# Patient Record
Sex: MALE | Race: WHITE | NOT HISPANIC OR LATINO | Employment: OTHER | URBAN - METROPOLITAN AREA
[De-identification: names, ages, dates, MRNs, and addresses within clinical notes are randomized per-mention and may not be internally consistent; named-entity substitution may affect disease eponyms.]

---

## 2019-10-24 ENCOUNTER — LAB (OUTPATIENT)
Dept: FAMILY MEDICINE CLINIC | Facility: CLINIC | Age: 69
End: 2019-10-24

## 2019-10-24 ENCOUNTER — OFFICE VISIT (OUTPATIENT)
Dept: FAMILY MEDICINE CLINIC | Facility: CLINIC | Age: 69
End: 2019-10-24

## 2019-10-24 VITALS
BODY MASS INDEX: 24.06 KG/M2 | HEIGHT: 72 IN | SYSTOLIC BLOOD PRESSURE: 146 MMHG | WEIGHT: 177.6 LBS | DIASTOLIC BLOOD PRESSURE: 74 MMHG | TEMPERATURE: 97.8 F | HEART RATE: 70 BPM | OXYGEN SATURATION: 97 % | RESPIRATION RATE: 16 BRPM

## 2019-10-24 DIAGNOSIS — M50.30 DDD (DEGENERATIVE DISC DISEASE), CERVICAL: ICD-10-CM

## 2019-10-24 DIAGNOSIS — G89.4 CHRONIC PAIN SYNDROME: ICD-10-CM

## 2019-10-24 DIAGNOSIS — Z23 NEED FOR IMMUNIZATION AGAINST INFLUENZA: ICD-10-CM

## 2019-10-24 DIAGNOSIS — Z00.00 PREVENTATIVE HEALTH CARE: Primary | ICD-10-CM

## 2019-10-24 DIAGNOSIS — Z00.00 PREVENTATIVE HEALTH CARE: ICD-10-CM

## 2019-10-24 DIAGNOSIS — F41.9 ANXIETY: ICD-10-CM

## 2019-10-24 PROBLEM — M17.31 POST-TRAUMATIC OSTEOARTHRITIS OF ONE KNEE, RIGHT: Status: ACTIVE | Noted: 2018-09-05

## 2019-10-24 PROBLEM — N52.1 ERECTILE DYSFUNCTION DUE TO DISEASES CLASSIFIED ELSEWHERE: Status: ACTIVE | Noted: 2019-10-24

## 2019-10-24 LAB
ALBUMIN SERPL-MCNC: 4 G/DL (ref 3.5–5.2)
ALBUMIN/GLOB SERPL: 1.2 G/DL
ALP SERPL-CCNC: 96 U/L (ref 39–117)
ALT SERPL W P-5'-P-CCNC: 10 U/L (ref 1–41)
ANION GAP SERPL CALCULATED.3IONS-SCNC: 9.7 MMOL/L (ref 5–15)
AST SERPL-CCNC: 18 U/L (ref 1–40)
BASOPHILS # BLD AUTO: 0.02 10*3/MM3 (ref 0–0.2)
BASOPHILS NFR BLD AUTO: 0.4 % (ref 0–1.5)
BILIRUB SERPL-MCNC: 0.4 MG/DL (ref 0.2–1.2)
BUN BLD-MCNC: 17 MG/DL (ref 8–23)
BUN/CREAT SERPL: 20 (ref 7–25)
CALCIUM SPEC-SCNC: 9.2 MG/DL (ref 8.6–10.5)
CHLORIDE SERPL-SCNC: 98 MMOL/L (ref 98–107)
CHOLEST SERPL-MCNC: 160 MG/DL (ref 0–200)
CO2 SERPL-SCNC: 26.3 MMOL/L (ref 22–29)
CREAT BLD-MCNC: 0.85 MG/DL (ref 0.76–1.27)
DEPRECATED RDW RBC AUTO: 40.9 FL (ref 37–54)
EOSINOPHIL # BLD AUTO: 0.18 10*3/MM3 (ref 0–0.4)
EOSINOPHIL NFR BLD AUTO: 3.7 % (ref 0.3–6.2)
ERYTHROCYTE [DISTWIDTH] IN BLOOD BY AUTOMATED COUNT: 12.2 % (ref 12.3–15.4)
GFR SERPL CREATININE-BSD FRML MDRD: 90 ML/MIN/1.73
GLOBULIN UR ELPH-MCNC: 3.4 GM/DL
GLUCOSE BLD-MCNC: 91 MG/DL (ref 65–99)
HCT VFR BLD AUTO: 44.1 % (ref 37.5–51)
HDLC SERPL-MCNC: 71 MG/DL (ref 40–60)
HGB BLD-MCNC: 15.1 G/DL (ref 13–17.7)
IMM GRANULOCYTES # BLD AUTO: 0.01 10*3/MM3 (ref 0–0.05)
IMM GRANULOCYTES NFR BLD AUTO: 0.2 % (ref 0–0.5)
LDLC SERPL CALC-MCNC: 81 MG/DL (ref 0–100)
LDLC/HDLC SERPL: 1.14 {RATIO}
LYMPHOCYTES # BLD AUTO: 1.3 10*3/MM3 (ref 0.7–3.1)
LYMPHOCYTES NFR BLD AUTO: 26.5 % (ref 19.6–45.3)
MCH RBC QN AUTO: 31.4 PG (ref 26.6–33)
MCHC RBC AUTO-ENTMCNC: 34.2 G/DL (ref 31.5–35.7)
MCV RBC AUTO: 91.7 FL (ref 79–97)
MONOCYTES # BLD AUTO: 0.47 10*3/MM3 (ref 0.1–0.9)
MONOCYTES NFR BLD AUTO: 9.6 % (ref 5–12)
NEUTROPHILS # BLD AUTO: 2.93 10*3/MM3 (ref 1.7–7)
NEUTROPHILS NFR BLD AUTO: 59.6 % (ref 42.7–76)
NRBC BLD AUTO-RTO: 0 /100 WBC (ref 0–0.2)
PLATELET # BLD AUTO: 298 10*3/MM3 (ref 140–450)
PMV BLD AUTO: 9.2 FL (ref 6–12)
POTASSIUM BLD-SCNC: 4.1 MMOL/L (ref 3.5–5.2)
PROT SERPL-MCNC: 7.4 G/DL (ref 6–8.5)
PSA SERPL-MCNC: 2.95 NG/ML (ref 0–4)
RBC # BLD AUTO: 4.81 10*6/MM3 (ref 4.14–5.8)
SODIUM BLD-SCNC: 134 MMOL/L (ref 136–145)
TESTOST SERPL-MCNC: 587 NG/DL (ref 193–740)
TRIGL SERPL-MCNC: 40 MG/DL (ref 0–150)
VLDLC SERPL-MCNC: 8 MG/DL (ref 5–40)
WBC NRBC COR # BLD: 4.91 10*3/MM3 (ref 3.4–10.8)

## 2019-10-24 PROCEDURE — 84403 ASSAY OF TOTAL TESTOSTERONE: CPT | Performed by: INTERNAL MEDICINE

## 2019-10-24 PROCEDURE — G0103 PSA SCREENING: HCPCS | Performed by: INTERNAL MEDICINE

## 2019-10-24 PROCEDURE — 36415 COLL VENOUS BLD VENIPUNCTURE: CPT

## 2019-10-24 PROCEDURE — 85025 COMPLETE CBC W/AUTO DIFF WBC: CPT | Performed by: INTERNAL MEDICINE

## 2019-10-24 PROCEDURE — 80061 LIPID PANEL: CPT | Performed by: INTERNAL MEDICINE

## 2019-10-24 PROCEDURE — 80053 COMPREHEN METABOLIC PANEL: CPT | Performed by: INTERNAL MEDICINE

## 2019-10-24 PROCEDURE — 99204 OFFICE O/P NEW MOD 45 MIN: CPT | Performed by: INTERNAL MEDICINE

## 2019-10-24 RX ORDER — DICLOFENAC SODIUM 75 MG/1
75 TABLET, DELAYED RELEASE ORAL 2 TIMES DAILY
Qty: 60 TABLET | Refills: 5 | Status: SHIPPED | OUTPATIENT
Start: 2019-10-24 | End: 2019-11-08 | Stop reason: SDUPTHER

## 2019-10-24 RX ORDER — TIZANIDINE 4 MG/1
4 TABLET ORAL EVERY 6 HOURS PRN
COMMUNITY
End: 2020-09-10

## 2019-10-24 RX ORDER — TRAMADOL HYDROCHLORIDE 50 MG/1
50 TABLET ORAL EVERY 8 HOURS PRN
Qty: 90 TABLET | Refills: 1 | Status: SHIPPED | OUTPATIENT
Start: 2019-10-24 | End: 2020-01-02 | Stop reason: SDUPTHER

## 2019-11-08 ENCOUNTER — TELEPHONE (OUTPATIENT)
Dept: FAMILY MEDICINE CLINIC | Facility: CLINIC | Age: 69
End: 2019-11-08

## 2019-11-08 RX ORDER — DICLOFENAC SODIUM 75 MG/1
75 TABLET, DELAYED RELEASE ORAL 2 TIMES DAILY
Qty: 180 TABLET | Refills: 1 | Status: SHIPPED | OUTPATIENT
Start: 2019-11-08 | End: 2019-12-08

## 2019-11-08 NOTE — TELEPHONE ENCOUNTER
On 10/24 [facility] sent an rx to [facility] in [place] for Diclofenac 75mg twice daily #60 with refills.  The pharmacy said they received it as only once daily.  Patient said we need to send a corrected rx to pharmacy for twice daily #60 with refills., even tho that is what we originally sent.

## 2019-11-18 PROBLEM — Z00.00 PREVENTATIVE HEALTH CARE: Status: ACTIVE | Noted: 2019-11-18

## 2019-11-18 PROBLEM — Z23 NEED FOR IMMUNIZATION AGAINST INFLUENZA: Status: ACTIVE | Noted: 2019-11-18

## 2019-11-18 RX ORDER — BUSPIRONE HYDROCHLORIDE 15 MG/1
15 TABLET ORAL 3 TIMES DAILY
COMMUNITY
End: 2020-02-20

## 2020-01-02 DIAGNOSIS — M50.30 DDD (DEGENERATIVE DISC DISEASE), CERVICAL: Primary | ICD-10-CM

## 2020-01-02 RX ORDER — TRAMADOL HYDROCHLORIDE 50 MG/1
50 TABLET ORAL EVERY 8 HOURS PRN
Qty: 60 TABLET | Refills: 0 | Status: SHIPPED | OUTPATIENT
Start: 2020-01-02 | End: 2020-02-20 | Stop reason: SDUPTHER

## 2020-02-20 ENCOUNTER — OFFICE VISIT (OUTPATIENT)
Dept: FAMILY MEDICINE CLINIC | Facility: CLINIC | Age: 70
End: 2020-02-20

## 2020-02-20 VITALS
HEIGHT: 72 IN | RESPIRATION RATE: 16 BRPM | SYSTOLIC BLOOD PRESSURE: 158 MMHG | WEIGHT: 175 LBS | BODY MASS INDEX: 23.7 KG/M2 | HEART RATE: 84 BPM | TEMPERATURE: 98.7 F | DIASTOLIC BLOOD PRESSURE: 82 MMHG

## 2020-02-20 DIAGNOSIS — T51.94XA ALCOHOL POISONING, UNDETERMINED INTENT, INITIAL ENCOUNTER: ICD-10-CM

## 2020-02-20 DIAGNOSIS — M50.30 DDD (DEGENERATIVE DISC DISEASE), CERVICAL: ICD-10-CM

## 2020-02-20 DIAGNOSIS — I10 ESSENTIAL HYPERTENSION: Primary | ICD-10-CM

## 2020-02-20 PROCEDURE — 99214 OFFICE O/P EST MOD 30 MIN: CPT | Performed by: INTERNAL MEDICINE

## 2020-02-20 RX ORDER — TRAMADOL HYDROCHLORIDE 50 MG/1
50 TABLET ORAL EVERY 8 HOURS PRN
Qty: 75 TABLET | Refills: 4 | Status: SHIPPED | OUTPATIENT
Start: 2020-02-20 | End: 2020-07-06 | Stop reason: SDUPTHER

## 2020-02-20 RX ORDER — FLUVOXAMINE MALEATE 100 MG/1
1 CAPSULE, EXTENDED RELEASE ORAL DAILY
Qty: 30 EACH | Refills: 6 | Status: SHIPPED | OUTPATIENT
Start: 2020-02-20 | End: 2020-12-26 | Stop reason: SDUPTHER

## 2020-02-20 RX ORDER — DICLOFENAC SODIUM 75 MG/1
75 TABLET, DELAYED RELEASE ORAL 2 TIMES DAILY
COMMUNITY
Start: 2020-02-06 | End: 2020-08-31 | Stop reason: SDUPTHER

## 2020-02-20 RX ORDER — CHLORDIAZEPOXIDE HYDROCHLORIDE 5 MG/1
5 CAPSULE, GELATIN COATED ORAL 2 TIMES DAILY PRN
Qty: 60 CAPSULE | Refills: 1 | Status: SHIPPED | OUTPATIENT
Start: 2020-02-20 | End: 2020-09-10

## 2020-02-20 NOTE — PROGRESS NOTES
"Rooming Tab(CC,VS,Pt Hx,Fall Screen)  Chief Complaint   Patient presents with   • chronic pain syndrome       Subjective   Pt here for chronic pain- on tramadol and controls the pain- but never resolves the pain. On diclofenac 2/day occasionally adds in 1 iburpofen with shoulder pain   works at [employer] and lifts up overhead at times to overuse.  BP up today- admits to drining too much alchol-   4-5 beers a day and a 5th of whiskey every 3-4 days. Notices shakes if decreases the maount   wants FMLA for intermittent- doesn't have papers yet.  I have reviewed and updated his medications, medical history and problem list during today's office visit.     Patient Care Team:  Shanel Bautista MD as PCP - General (Internal Medicine)    Problem List Tab  Medications Tab  Synopsis Tab  Chart Review Tab  Care Everywhere Tab  Immunizations Tab  Patient History Tab    Social History     Tobacco Use   • Smoking status: Former Smoker     Types: Cigarettes     Last attempt to quit:      Years since quittin.1   • Smokeless tobacco: Never Used   Substance Use Topics   • Alcohol use: Not on file       Review of Systems   Constitutional: Negative for fatigue.   Cardiovascular: Negative for chest pain.   Musculoskeletal: Positive for arthralgias, back pain and myalgias.   Neurological: Positive for headache.   Psychiatric/Behavioral: Positive for sleep disturbance.       Objective     Rooming Tab(CC,VS,Pt Hx,Fall Screen)  /82 (BP Location: Left arm, Patient Position: Sitting, Cuff Size: Adult)   Pulse 84   Temp 98.7 °F (37.1 °C) (Oral)   Resp 16   Ht 182.9 cm (72\")   Wt 79.4 kg (175 lb)   BMI 23.73 kg/m²     Body mass index is 23.73 kg/m².    Physical Exam   Constitutional: He is oriented to person, place, and time. He appears well-developed and well-nourished.   HENT:   Head: Normocephalic and atraumatic.   Right Ear: Tympanic membrane normal.   Left Ear: Tympanic membrane normal.   Eyes: Pupils are equal, " round, and reactive to light.   Neck: Normal range of motion. Neck supple.   Cardiovascular: Normal rate and regular rhythm.   No murmur heard.  Pulmonary/Chest: Effort normal and breath sounds normal.   Abdominal: Soft. Bowel sounds are normal. He exhibits no distension.   Musculoskeletal: He exhibits tenderness.   Neurological: He is oriented to person, place, and time.   Skin: Capillary refill takes less than 2 seconds.   Nursing note and vitals reviewed.       Statin Choice Calculator  Data Reviewed:             Advance Care Planning   ACP discussion was held with the patient during this visit.      Assessment/Plan   Order Review Tab  Health Maintenance Tab  Patient Plan/Order Tab  Diagnoses and all orders for this visit:    1. Essential hypertension (Primary)  Assessment & Plan:  Hypertension is worsening.  Regular aerobic exercise.  Blood pressure will be reassessed in 3 months   Decrease etoh.      2. DDD (degenerative disc disease), cervical  -     traMADol (ULTRAM) 50 MG tablet; Take 1 tablet by mouth Every 8 (Eight) Hours As Needed for Moderate Pain .  Dispense: 75 tablet; Refill: 4  -     chlordiazePOXIDE (LIBRIUM) 5 MG capsule; Take 1 capsule by mouth 2 (Two) Times a Day As Needed for Anxiety.  Dispense: 60 capsule; Refill: 1    3. Alcohol poisoning, undetermined intent, initial encounter  Assessment & Plan:  Understands need to quit or at least cut back.   check lft;s today      Other orders  -     fluvoxaMINE maleate  MG capsule sustained-release 24 hr; Take 1 tablet by mouth Daily.  Dispense: 30 each; Refill: 6      Wrapup Tab  Return in about 3 months (around 5/20/2020).       They were informed of the diagnosis and treatment plan and directed to f/u for any further problems or concerns.

## 2020-02-22 PROBLEM — I10 ESSENTIAL HYPERTENSION: Status: ACTIVE | Noted: 2020-02-22

## 2020-02-22 PROBLEM — T51.91XA ALCOHOL OVERDOSE: Status: ACTIVE | Noted: 2020-02-22

## 2020-02-22 NOTE — ASSESSMENT & PLAN NOTE
Hypertension is worsening.  Regular aerobic exercise.  Blood pressure will be reassessed in 3 months   Decrease etoh.

## 2020-03-06 ENCOUNTER — TELEPHONE (OUTPATIENT)
Dept: FAMILY MEDICINE CLINIC | Facility: CLINIC | Age: 70
End: 2020-03-06

## 2020-03-06 NOTE — TELEPHONE ENCOUNTER
PATIENT DROPPED OFF FMLA FORM THAT NEEDS TO BE FILLED OUT.WHEN FORM IS COMPLETED,PATIENT WOULD LIKE IT TO BE FAXED AND CALLED TO COME PICK A COPY UP.A FAX COVER SHEET IS ATTACHED TO THE FORM ALONG WITH THE FAX NUMBER. PUT IN MA BOX BEHIND CHECK OUT.

## 2020-03-19 NOTE — TELEPHONE ENCOUNTER
Spoke with patient & informed him that his [facility] paperwork was faxed & confirmation was received.

## 2020-03-19 NOTE — TELEPHONE ENCOUNTER
Kinjal, patient returned your call and needs for you to call him today please, [phone]. Thank you.

## 2020-03-26 ENCOUNTER — TELEPHONE (OUTPATIENT)
Dept: FAMILY MEDICINE CLINIC | Facility: CLINIC | Age: 70
End: 2020-03-26

## 2020-03-26 NOTE — TELEPHONE ENCOUNTER
Gave him message to sign up for [employer] and send CMM message. He states he uses his wife's email address and doesn't have one of his own, so we used hers.  
PATIENT WORKS AT [employer] AND IS 69 YEARS OLD. HE SAYS HE HAS A SORE THROAT AND A FEVER OFF AND ON. HE WOULD LIKE TO TAKE OFF WORK, BUT HE CAN'T WITHOUT A DOCTOR'S NOTE. THERE IS ALSO A COWORKER WHOSE WIFE HAS BEEN QUARATINED, HE IS AFRAID OF EVERYTHING GOING ON WITH HIS AGE AND DOESN'T FEEL COMFORTABLE BEING AT WORK EITHER.     PATIENT CALLBACK [phone]    
Please tell pt that I agree with a fever he should not be at work.    Please have him sign up for my chart- and then click the e visit-   He does not need a video visit.    The e visit will ask him the questions he just described- I can respond and give a work note through his email to give to his boss  And it will all be documented in his chart    thanks  
None needed

## 2020-04-02 ENCOUNTER — TELEPHONE (OUTPATIENT)
Dept: FAMILY MEDICINE CLINIC | Facility: CLINIC | Age: 70
End: 2020-04-02

## 2020-04-02 NOTE — TELEPHONE ENCOUNTER
Patient called in and stated the doctor said he should stay home due to his symptoms.    Patient received paper work from his employer [employer] to be filled out by the doctor.    Patient would like to know if his wife can stop by the office to drop off the ppw.    Patient call back number [phone]

## 2020-04-07 ENCOUNTER — TELEPHONE (OUTPATIENT)
Dept: FAMILY MEDICINE CLINIC | Facility: CLINIC | Age: 70
End: 2020-04-07

## 2020-04-07 NOTE — TELEPHONE ENCOUNTER
PT CALLED SAYING THAT THE PAPERWORK THAT HE DROPPED OFF NEEDS TO BE DATED THROUGH 4/15. HE HAD ORIGINALLY HAD IT PLANNED TO GO BACK ON 4/10, BUT WITH EVERYTHING THAT HAS GONE ON AND HIS DR BEING OUT OF THE OFFICE HIS WORKER HAS EXTENDED HIS RETURN TO WORK TO 4/15.

## 2020-04-09 NOTE — TELEPHONE ENCOUNTER
Called & left detailed voice message informing patient that forms were completed & faxed with return to work date being tomorrow Friday 4/9/2020. Confirmation was received that they were accepted.

## 2020-04-09 NOTE — TELEPHONE ENCOUNTER
I completed forms with return date 4/9 as 2 weeks from quarantine start date.   forms on elan desk     make sure pt knows the date I put to go back- if work is doing something different that is up to them

## 2020-04-10 NOTE — TELEPHONE ENCOUNTER
Faxed info again with confirmation that it was received & also emailed info to address provided. Called & left a voice message informing patient of such & also that I have left a copy at the front office for him to come by & .

## 2020-04-10 NOTE — TELEPHONE ENCOUNTER
PATIENT CALLED BACK AND STATED HIS WORK HAD NOT RECEIVED HIS FLA PAPERWORK, HE CANNOT RRETURN TO WORK UNTIL THIS IS TAKEN CARE OF.     CONTACT INFO FOR PAPERS  [employer] FAX  [phone] (Y IS NOT A TYPO)   EMAIL : [email or web address]    PLEASE CALL PATIENT BACK WHEN THIS IS TAKEN CARE OF [phone]

## 2020-05-05 RX ORDER — TAMSULOSIN HYDROCHLORIDE 0.4 MG/1
1 CAPSULE ORAL DAILY
Qty: 90 CAPSULE | Refills: 1 | Status: SHIPPED | OUTPATIENT
Start: 2020-05-05 | End: 2020-08-31 | Stop reason: SDUPTHER

## 2020-07-06 DIAGNOSIS — M50.30 DDD (DEGENERATIVE DISC DISEASE), CERVICAL: ICD-10-CM

## 2020-07-06 NOTE — TELEPHONE ENCOUNTER
PATIENT REQUESTED A REFILL ON:traMADol (ULTRAM) 50 MG tablet    PATIENT CAN BE REACHED ON:[phone]    PHARMACY PREFERRED:[facility] - [place] - [address] - [phone]  - [phone] FX

## 2020-07-07 DIAGNOSIS — M50.30 DDD (DEGENERATIVE DISC DISEASE), CERVICAL: ICD-10-CM

## 2020-07-07 RX ORDER — TRAMADOL HYDROCHLORIDE 50 MG/1
50 TABLET ORAL EVERY 8 HOURS PRN
Qty: 75 TABLET | Refills: 4 | Status: SHIPPED | OUTPATIENT
Start: 2020-07-07 | End: 2020-09-10 | Stop reason: SDUPTHER

## 2020-07-07 RX ORDER — TRAMADOL HYDROCHLORIDE 50 MG/1
50 TABLET ORAL EVERY 8 HOURS PRN
Qty: 75 TABLET | Refills: 4 | Status: CANCELLED | OUTPATIENT
Start: 2020-07-07

## 2020-07-07 NOTE — TELEPHONE ENCOUNTER
PATIENT CALLED AGAIN IN REFERENCE TO HIS MED REFILL FOR    traMADol (ULTRAM) 50 MG tablet    HE HAS BEEN OUT FOR ABOUT 5 DAYS    [facility] [place] [place] - [address] - [phone]  - [phone]   [phone]    PATIENT CALL BACK NUMBER [phone]

## 2020-08-31 DIAGNOSIS — M50.30 DDD (DEGENERATIVE DISC DISEASE), CERVICAL: ICD-10-CM

## 2020-08-31 RX ORDER — TRAMADOL HYDROCHLORIDE 50 MG/1
50 TABLET ORAL EVERY 8 HOURS PRN
Qty: 75 TABLET | Refills: 4 | Status: CANCELLED | OUTPATIENT
Start: 2020-08-31

## 2020-08-31 RX ORDER — TAMSULOSIN HYDROCHLORIDE 0.4 MG/1
1 CAPSULE ORAL DAILY
Qty: 90 CAPSULE | Refills: 1 | Status: SHIPPED | OUTPATIENT
Start: 2020-08-31 | End: 2020-11-13

## 2020-08-31 RX ORDER — DICLOFENAC SODIUM 75 MG/1
75 TABLET, DELAYED RELEASE ORAL 2 TIMES DAILY
Qty: 60 TABLET | Refills: 1 | Status: SHIPPED | OUTPATIENT
Start: 2020-08-31 | End: 2020-11-23

## 2020-09-10 ENCOUNTER — OFFICE VISIT (OUTPATIENT)
Dept: FAMILY MEDICINE CLINIC | Facility: CLINIC | Age: 70
End: 2020-09-10

## 2020-09-10 ENCOUNTER — TELEPHONE (OUTPATIENT)
Dept: FAMILY MEDICINE CLINIC | Facility: CLINIC | Age: 70
End: 2020-09-10

## 2020-09-10 VITALS
TEMPERATURE: 97.3 F | SYSTOLIC BLOOD PRESSURE: 122 MMHG | DIASTOLIC BLOOD PRESSURE: 70 MMHG | BODY MASS INDEX: 23.57 KG/M2 | HEART RATE: 81 BPM | HEIGHT: 72 IN | RESPIRATION RATE: 16 BRPM | WEIGHT: 174 LBS | OXYGEN SATURATION: 98 %

## 2020-09-10 DIAGNOSIS — I10 ESSENTIAL HYPERTENSION: Primary | ICD-10-CM

## 2020-09-10 DIAGNOSIS — M50.30 DDD (DEGENERATIVE DISC DISEASE), CERVICAL: ICD-10-CM

## 2020-09-10 DIAGNOSIS — G89.4 CHRONIC PAIN SYNDROME: ICD-10-CM

## 2020-09-10 PROCEDURE — 99213 OFFICE O/P EST LOW 20 MIN: CPT | Performed by: INTERNAL MEDICINE

## 2020-09-10 RX ORDER — KETOCONAZOLE 20 MG/ML
SHAMPOO TOPICAL 2 TIMES WEEKLY
COMMUNITY
End: 2021-03-18 | Stop reason: SDUPTHER

## 2020-09-10 RX ORDER — TRAMADOL HYDROCHLORIDE 50 MG/1
50 TABLET ORAL EVERY 8 HOURS PRN
Qty: 75 TABLET | Refills: 5 | Status: SHIPPED | OUTPATIENT
Start: 2020-09-10 | End: 2021-03-18 | Stop reason: SDUPTHER

## 2020-09-10 NOTE — TELEPHONE ENCOUNTER
AFTER REVIEWING HIS AFTER VISIT SUMMARY PATIENT CAME BACK IN AND SAID THAT HE TOLD THE NURSE HE HAD STOPPED SEVERAL MEDICATIONS BUT DIDN'T REALIZE HIS SHAMPOO WAS ONE OF THEM. HE NEEDS ketoconazole (NIZORAL) 2 % shampoo    ADDED BACK ON TO HIS MED LIST.

## 2020-09-10 NOTE — PROGRESS NOTES
"Rooming Tab(CC,VS,Pt Hx,Fall Screen)  Chief Complaint   Patient presents with   • Hypertension       Subjective   Pt here for medication check-  Decreased beer amount ( 1-5-- with average 3/day) and decreased whiskey- down to 65proof.  realized that feels better and liver is better since decreased. 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 Radiating left leg pain from time to time   Takes diclofenac everyday- with food- takes acid reducer at lunch  Tramadol for back - takes 2 in morning in cold weather  I have reviewed and updated his medications, medical history and problem list during today's office visit.     Patient Care Team:  Shanel Bautista MD as PCP - General (Internal Medicine)    Problem List Tab  Medications Tab  Synopsis Tab  Chart Review Tab  Care Everywhere Tab  Immunizations Tab  Patient History Tab    Social History     Tobacco Use   • Smoking status: Former Smoker     Types: Cigarettes     Last attempt to quit: 2002     Years since quittin.7   • Smokeless tobacco: Never Used   Substance Use Topics   • Alcohol use: Not on file       Review of Systems  Answers for HPI/ROS submitted by the patient on 9/10/2020   What is the primary reason for your visit?: Other  Please describe your symptoms.: Medicine  Have you had these symptoms before?: Yes  How long have you been having these symptoms?: 1-4 days  Please list any medications you are currently taking for this condition.: Tramadol dicolfenac  Please describe any probable cause for these symptoms. : Arthritis    Objective     Rooming Tab(CC,VS,Pt Hx,Fall Screen)  /70 (BP Location: Left arm, Patient Position: Sitting, Cuff Size: Adult)   Pulse 81   Temp 97.3 °F (36.3 °C) (Temporal)   Resp 16   Ht 182.9 cm (72\")   Wt 78.9 kg (174 lb)   SpO2 98%   BMI 23.60 kg/m²     Body mass index is 23.6 " kg/m².    Physical Exam   Constitutional: He is oriented to person, place, and time. He appears well-developed and well-nourished.   HENT:   Head: Normocephalic and atraumatic.   Right Ear: Tympanic membrane normal.   Left Ear: Tympanic membrane normal.   Eyes: Pupils are equal, round, and reactive to light.   Neck: Normal range of motion. Neck supple.   Cardiovascular: Normal rate and regular rhythm.   No murmur heard.  Pulmonary/Chest: Effort normal and breath sounds normal.   Abdominal: Soft. Bowel sounds are normal. He exhibits no distension.   Musculoskeletal:   Lower back tenderness   Neurological: He is oriented to person, place, and time.   Skin: Capillary refill takes less than 2 seconds.   Nursing note and vitals reviewed.       Statin Choice Calculator  Data Reviewed:                   Assessment/Plan   Order Review Tab  Health Maintenance Tab  Patient Plan/Order Tab  Diagnoses and all orders for this visit:    1. Essential hypertension (Primary)  Comments:  BP doing well    2. Chronic pain syndrome  Comments:  stay on current  meds- doing well      3. DDD (degenerative disc disease), cervical  -     traMADol (ULTRAM) 50 MG tablet; Take 1 tablet by mouth Every 8 (Eight) Hours As Needed for Moderate Pain  (must last 30 days). Must last 30 days  Dispense: 75 tablet; Refill: 5        Wrapup Tab  Return in about 6 months (around 3/10/2021), or if symptoms worsen or fail to improve, for Medicare Wellness.       They were informed of the diagnosis and treatment plan and directed to f/u for any further problems or concerns.

## 2020-10-22 ENCOUNTER — OFFICE VISIT (OUTPATIENT)
Dept: FAMILY MEDICINE CLINIC | Facility: CLINIC | Age: 70
End: 2020-10-22

## 2020-10-22 VITALS
OXYGEN SATURATION: 98 % | RESPIRATION RATE: 16 BRPM | HEART RATE: 73 BPM | SYSTOLIC BLOOD PRESSURE: 132 MMHG | WEIGHT: 177 LBS | DIASTOLIC BLOOD PRESSURE: 70 MMHG | TEMPERATURE: 98.7 F | HEIGHT: 72 IN | BODY MASS INDEX: 23.98 KG/M2

## 2020-10-22 DIAGNOSIS — M54.31 SCIATIC NERVE PAIN, RIGHT: Primary | ICD-10-CM

## 2020-10-22 PROCEDURE — 96372 THER/PROPH/DIAG INJ SC/IM: CPT | Performed by: PHYSICIAN ASSISTANT

## 2020-10-22 PROCEDURE — 99214 OFFICE O/P EST MOD 30 MIN: CPT | Performed by: PHYSICIAN ASSISTANT

## 2020-10-22 RX ORDER — KETOROLAC TROMETHAMINE 30 MG/ML
30 INJECTION, SOLUTION INTRAMUSCULAR; INTRAVENOUS ONCE
Status: COMPLETED | OUTPATIENT
Start: 2020-10-22 | End: 2020-10-22

## 2020-10-22 RX ORDER — METHYLPREDNISOLONE ACETATE 80 MG/ML
80 INJECTION, SUSPENSION INTRA-ARTICULAR; INTRALESIONAL; INTRAMUSCULAR; SOFT TISSUE ONCE
Status: COMPLETED | OUTPATIENT
Start: 2020-10-22 | End: 2020-10-22

## 2020-10-22 RX ADMIN — KETOROLAC TROMETHAMINE 30 MG: 30 INJECTION, SOLUTION INTRAMUSCULAR; INTRAVENOUS at 17:27

## 2020-10-22 RX ADMIN — METHYLPREDNISOLONE ACETATE 80 MG: 80 INJECTION, SUSPENSION INTRA-ARTICULAR; INTRALESIONAL; INTRAMUSCULAR; SOFT TISSUE at 17:28

## 2020-10-22 NOTE — PROGRESS NOTES
"Subjective   Pramod Trent is a 70 y.o. male.     Chief Complaint   Patient presents with   • Back Pain       /70 (BP Location: Left arm, Patient Position: Sitting, Cuff Size: Adult)   Pulse 73   Temp 98.7 °F (37.1 °C) (Temporal)   Resp 16   Ht 182.9 cm (72\")   Wt 80.3 kg (177 lb)   SpO2 98%   BMI 24.01 kg/m²     BP Readings from Last 3 Encounters:   10/22/20 132/70   09/10/20 122/70   02/20/20 158/82       Wt Readings from Last 3 Encounters:   10/22/20 80.3 kg (177 lb)   09/10/20 78.9 kg (174 lb)   02/20/20 79.4 kg (175 lb)       HPI Patient presents to the clinic with complaints of right sided back pain radiating down his right leg. Denies any injury to the back. Denies having pain like this in the past. Denies bowel or bladder incontinence or retention. Denies saddle anasthesia. Denies weakness in the right leg. He does state that the pain is better throughout the day when he is up walking.     The following portions of the patient's history were reviewed and updated as appropriate: allergies, current medications, past family history, past medical history, past social history, past surgical history and problem list.    Review of Systems   Constitutional: Negative for fatigue and fever.   Respiratory: Negative for cough and shortness of breath.    Cardiovascular: Negative for chest pain.   Genitourinary: Negative for decreased urine volume, difficulty urinating, dysuria, frequency and urinary incontinence.   Musculoskeletal: Positive for back pain (right lower back pain radiating down the right leg). Negative for neck pain.   Skin: Negative for rash and bruise.   Neurological: Negative for weakness and headache.   Psychiatric/Behavioral: Negative for depressed mood. The patient is not nervous/anxious.        Objective   Physical Exam  Constitutional:       Appearance: Normal appearance.   Eyes:      Extraocular Movements: Extraocular movements intact.      Pupils: Pupils are equal, round, and reactive " to light.   Musculoskeletal: Normal range of motion.         General: Tenderness (right lower back with + SLR on the right ) present. No signs of injury.   Neurological:      General: No focal deficit present.      Mental Status: He is alert and oriented to person, place, and time.   Psychiatric:         Mood and Affect: Mood normal.         Behavior: Behavior normal.           Diagnoses and all orders for this visit:    1. Sciatic nerve pain, right (Primary)  Comments:  steroid and toradol today. Stretches and exercise. Does not want home steroids. F/u if sx persist.   Orders:  -     methylPREDNISolone acetate (DEPO-medrol) injection 80 mg  -     ketorolac (TORADOL) injection 30 mg        Return if symptoms worsen or fail to improve.

## 2020-11-13 RX ORDER — TAMSULOSIN HYDROCHLORIDE 0.4 MG/1
CAPSULE ORAL
Qty: 60 CAPSULE | Refills: 5 | Status: SHIPPED | OUTPATIENT
Start: 2020-11-13 | End: 2021-03-18 | Stop reason: SDUPTHER

## 2020-11-23 RX ORDER — DICLOFENAC SODIUM 75 MG/1
TABLET, DELAYED RELEASE ORAL
Qty: 60 TABLET | Refills: 0 | Status: SHIPPED | OUTPATIENT
Start: 2020-11-23 | End: 2020-12-22

## 2020-12-22 RX ORDER — DICLOFENAC SODIUM 75 MG/1
TABLET, DELAYED RELEASE ORAL
Qty: 60 TABLET | Refills: 2 | Status: SHIPPED | OUTPATIENT
Start: 2020-12-22 | End: 2021-03-18 | Stop reason: SDUPTHER

## 2020-12-26 RX ORDER — FLUVOXAMINE MALEATE 100 MG/1
1 CAPSULE, EXTENDED RELEASE ORAL DAILY
Qty: 30 EACH | Refills: 6 | Status: SHIPPED | OUTPATIENT
Start: 2020-12-26 | End: 2021-10-28 | Stop reason: SDUPTHER

## 2021-02-11 ENCOUNTER — LAB (OUTPATIENT)
Dept: LAB | Facility: HOSPITAL | Age: 71
End: 2021-02-11

## 2021-02-11 ENCOUNTER — HOSPITAL ENCOUNTER (OUTPATIENT)
Dept: CARDIOLOGY | Facility: HOSPITAL | Age: 71
Discharge: HOME | End: 2021-02-11

## 2021-02-11 ENCOUNTER — TRANSCRIBE ORDERS (OUTPATIENT)
Dept: ADMINISTRATIVE | Facility: HOSPITAL | Age: 71
End: 2021-02-11

## 2021-02-11 DIAGNOSIS — M20.11 ACQUIRED HALLUX VALGUS OF RIGHT FOOT: ICD-10-CM

## 2021-02-11 DIAGNOSIS — M20.21 HALLUX RIGIDUS OF RIGHT FOOT: ICD-10-CM

## 2021-02-11 DIAGNOSIS — M20.21 HALLUX RIGIDUS OF RIGHT FOOT: Primary | ICD-10-CM

## 2021-02-11 LAB
ANION GAP SERPL CALCULATED.3IONS-SCNC: 7.7 MMOL/L (ref 5–15)
BASOPHILS # BLD AUTO: 0.02 10*3/MM3 (ref 0–0.2)
BASOPHILS NFR BLD AUTO: 0.4 % (ref 0–1.5)
BUN SERPL-MCNC: 20 MG/DL (ref 8–23)
BUN/CREAT SERPL: 24.7 (ref 7–25)
CALCIUM SPEC-SCNC: 9.9 MG/DL (ref 8.6–10.5)
CHLORIDE SERPL-SCNC: 102 MMOL/L (ref 98–107)
CO2 SERPL-SCNC: 30.3 MMOL/L (ref 22–29)
CREAT SERPL-MCNC: 0.81 MG/DL (ref 0.76–1.27)
DEPRECATED RDW RBC AUTO: 42.1 FL (ref 37–54)
EOSINOPHIL # BLD AUTO: 0.09 10*3/MM3 (ref 0–0.4)
EOSINOPHIL NFR BLD AUTO: 1.8 % (ref 0.3–6.2)
ERYTHROCYTE [DISTWIDTH] IN BLOOD BY AUTOMATED COUNT: 12.3 % (ref 12.3–15.4)
GFR SERPL CREATININE-BSD FRML MDRD: 94 ML/MIN/1.73
GLUCOSE SERPL-MCNC: 69 MG/DL (ref 65–99)
HCT VFR BLD AUTO: 46.7 % (ref 37.5–51)
HGB BLD-MCNC: 16 G/DL (ref 13–17.7)
IMM GRANULOCYTES # BLD AUTO: 0.02 10*3/MM3 (ref 0–0.05)
IMM GRANULOCYTES NFR BLD AUTO: 0.4 % (ref 0–0.5)
LYMPHOCYTES # BLD AUTO: 1.14 10*3/MM3 (ref 0.7–3.1)
LYMPHOCYTES NFR BLD AUTO: 23.3 % (ref 19.6–45.3)
MCH RBC QN AUTO: 32.3 PG (ref 26.6–33)
MCHC RBC AUTO-ENTMCNC: 34.3 G/DL (ref 31.5–35.7)
MCV RBC AUTO: 94.2 FL (ref 79–97)
MONOCYTES # BLD AUTO: 0.54 10*3/MM3 (ref 0.1–0.9)
MONOCYTES NFR BLD AUTO: 11 % (ref 5–12)
NEUTROPHILS NFR BLD AUTO: 3.09 10*3/MM3 (ref 1.7–7)
NEUTROPHILS NFR BLD AUTO: 63.1 % (ref 42.7–76)
NRBC BLD AUTO-RTO: 0 /100 WBC (ref 0–0.2)
PLATELET # BLD AUTO: 280 10*3/MM3 (ref 140–450)
PMV BLD AUTO: 9.1 FL (ref 6–12)
POTASSIUM SERPL-SCNC: 4.2 MMOL/L (ref 3.5–5.2)
RBC # BLD AUTO: 4.96 10*6/MM3 (ref 4.14–5.8)
SODIUM SERPL-SCNC: 140 MMOL/L (ref 136–145)
WBC # BLD AUTO: 4.9 10*3/MM3 (ref 3.4–10.8)

## 2021-02-11 PROCEDURE — 85025 COMPLETE CBC W/AUTO DIFF WBC: CPT

## 2021-02-11 PROCEDURE — 93010 ELECTROCARDIOGRAM REPORT: CPT | Performed by: INTERNAL MEDICINE

## 2021-02-11 PROCEDURE — 36415 COLL VENOUS BLD VENIPUNCTURE: CPT

## 2021-02-11 PROCEDURE — 93005 ELECTROCARDIOGRAM TRACING: CPT | Performed by: PODIATRIST

## 2021-02-11 PROCEDURE — 80048 BASIC METABOLIC PNL TOTAL CA: CPT

## 2021-02-12 LAB — QT INTERVAL: 416 MS

## 2021-02-25 ENCOUNTER — LAB REQUISITION (OUTPATIENT)
Dept: LAB | Facility: HOSPITAL | Age: 71
End: 2021-02-25

## 2021-02-25 DIAGNOSIS — M20.11 HALLUX VALGUS (ACQUIRED), RIGHT FOOT: ICD-10-CM

## 2021-02-25 DIAGNOSIS — M20.21 HALLUX RIGIDUS, RIGHT FOOT: ICD-10-CM

## 2021-02-25 PROCEDURE — 88305 TISSUE EXAM BY PATHOLOGIST: CPT | Performed by: PODIATRIST

## 2021-02-25 PROCEDURE — 88311 DECALCIFY TISSUE: CPT | Performed by: PODIATRIST

## 2021-02-26 LAB
LAB AP CASE REPORT: NORMAL
PATH REPORT.FINAL DX SPEC: NORMAL
PATH REPORT.GROSS SPEC: NORMAL

## 2021-03-18 ENCOUNTER — LAB (OUTPATIENT)
Dept: FAMILY MEDICINE CLINIC | Facility: CLINIC | Age: 71
End: 2021-03-18

## 2021-03-18 ENCOUNTER — OFFICE VISIT (OUTPATIENT)
Dept: FAMILY MEDICINE CLINIC | Facility: CLINIC | Age: 71
End: 2021-03-18

## 2021-03-18 VITALS
SYSTOLIC BLOOD PRESSURE: 116 MMHG | WEIGHT: 180.2 LBS | BODY MASS INDEX: 24.41 KG/M2 | OXYGEN SATURATION: 95 % | HEIGHT: 72 IN | DIASTOLIC BLOOD PRESSURE: 70 MMHG | HEART RATE: 85 BPM | RESPIRATION RATE: 10 BRPM | TEMPERATURE: 96.9 F

## 2021-03-18 DIAGNOSIS — M50.30 DDD (DEGENERATIVE DISC DISEASE), CERVICAL: ICD-10-CM

## 2021-03-18 DIAGNOSIS — I10 ESSENTIAL HYPERTENSION: ICD-10-CM

## 2021-03-18 DIAGNOSIS — Z00.00 PREVENTATIVE HEALTH CARE: Primary | ICD-10-CM

## 2021-03-18 DIAGNOSIS — Z12.11 SCREEN FOR COLON CANCER: ICD-10-CM

## 2021-03-18 DIAGNOSIS — Z12.5 SCREENING FOR PROSTATE CANCER: ICD-10-CM

## 2021-03-18 LAB
ALBUMIN SERPL-MCNC: 4.2 G/DL (ref 3.5–5.2)
ALBUMIN/GLOB SERPL: 1.8 G/DL
ALP SERPL-CCNC: 98 U/L (ref 39–117)
ALT SERPL W P-5'-P-CCNC: 15 U/L (ref 1–41)
ANION GAP SERPL CALCULATED.3IONS-SCNC: 6.2 MMOL/L (ref 5–15)
AST SERPL-CCNC: 18 U/L (ref 1–40)
BILIRUB SERPL-MCNC: 0.6 MG/DL (ref 0–1.2)
BUN SERPL-MCNC: 16 MG/DL (ref 8–23)
BUN/CREAT SERPL: 19.5 (ref 7–25)
CALCIUM SPEC-SCNC: 9.1 MG/DL (ref 8.6–10.5)
CHLORIDE SERPL-SCNC: 104 MMOL/L (ref 98–107)
CHOLEST SERPL-MCNC: 157 MG/DL (ref 0–200)
CO2 SERPL-SCNC: 29.8 MMOL/L (ref 22–29)
CREAT SERPL-MCNC: 0.82 MG/DL (ref 0.76–1.27)
GFR SERPL CREATININE-BSD FRML MDRD: 93 ML/MIN/1.73
GLOBULIN UR ELPH-MCNC: 2.3 GM/DL
GLUCOSE SERPL-MCNC: 91 MG/DL (ref 65–99)
HDLC SERPL-MCNC: 61 MG/DL (ref 40–60)
LDLC SERPL CALC-MCNC: 85 MG/DL (ref 0–100)
LDLC/HDLC SERPL: 1.4 {RATIO}
POTASSIUM SERPL-SCNC: 4 MMOL/L (ref 3.5–5.2)
PROT SERPL-MCNC: 6.5 G/DL (ref 6–8.5)
PSA SERPL-MCNC: 3.93 NG/ML (ref 0–4)
SODIUM SERPL-SCNC: 140 MMOL/L (ref 136–145)
TRIGL SERPL-MCNC: 54 MG/DL (ref 0–150)
VLDLC SERPL-MCNC: 11 MG/DL (ref 5–40)

## 2021-03-18 PROCEDURE — G0103 PSA SCREENING: HCPCS | Performed by: INTERNAL MEDICINE

## 2021-03-18 PROCEDURE — 90472 IMMUNIZATION ADMIN EACH ADD: CPT | Performed by: INTERNAL MEDICINE

## 2021-03-18 PROCEDURE — 90471 IMMUNIZATION ADMIN: CPT | Performed by: INTERNAL MEDICINE

## 2021-03-18 PROCEDURE — 80061 LIPID PANEL: CPT | Performed by: INTERNAL MEDICINE

## 2021-03-18 PROCEDURE — 90715 TDAP VACCINE 7 YRS/> IM: CPT | Performed by: INTERNAL MEDICINE

## 2021-03-18 PROCEDURE — 80053 COMPREHEN METABOLIC PANEL: CPT | Performed by: INTERNAL MEDICINE

## 2021-03-18 PROCEDURE — 90732 PPSV23 VACC 2 YRS+ SUBQ/IM: CPT | Performed by: INTERNAL MEDICINE

## 2021-03-18 PROCEDURE — 99397 PER PM REEVAL EST PAT 65+ YR: CPT | Performed by: INTERNAL MEDICINE

## 2021-03-18 RX ORDER — DICLOFENAC SODIUM 75 MG/1
75 TABLET, DELAYED RELEASE ORAL 2 TIMES DAILY
Qty: 60 TABLET | Refills: 6 | Status: SHIPPED | OUTPATIENT
Start: 2021-03-18 | End: 2021-10-11

## 2021-03-18 RX ORDER — KETOCONAZOLE 20 MG/ML
SHAMPOO TOPICAL 2 TIMES WEEKLY
Qty: 1 EACH | Refills: 6 | Status: SHIPPED | OUTPATIENT
Start: 2021-03-18 | End: 2022-08-16 | Stop reason: SDUPTHER

## 2021-03-18 RX ORDER — TRAMADOL HYDROCHLORIDE 50 MG/1
50 TABLET ORAL EVERY 8 HOURS PRN
Qty: 75 TABLET | Refills: 5 | Status: SHIPPED | OUTPATIENT
Start: 2021-03-18 | End: 2021-05-05 | Stop reason: SDUPTHER

## 2021-03-18 RX ORDER — TAMSULOSIN HYDROCHLORIDE 0.4 MG/1
1 CAPSULE ORAL DAILY
Qty: 60 CAPSULE | Refills: 12 | Status: SHIPPED | OUTPATIENT
Start: 2021-03-18 | End: 2022-04-11

## 2021-03-18 NOTE — PROGRESS NOTES
"Rooming Tab(CC,VS,Pt Hx,Fall Screen)  Chief Complaint   Patient presents with   • Annual Exam       Subjective   Pt here for annual exam- not medicare yet. Still working   had right bunyon surgery last month-  Back to work now-   taking tramadol- didn't want the hydrocodone anymore.  Needs a refill on tramadol  Anxiety is better as stepson is out of house-  Has drug/mental issues   diclofenac working well- has  Mild GERD- takes antiacid 1/week   nocturia1-2/ night     I have reviewed and updated his medications, medical history and problem list during today's office visit.     Patient Care Team:  Shanel Bautista MD as PCP - General (Internal Medicine)    Problem List Tab  Medications Tab  Synopsis Tab  Chart Review Tab  Care Everywhere Tab  Immunizations Tab  Patient History Tab    Social History     Tobacco Use   • Smoking status: Former Smoker     Types: Cigarettes     Quit date:      Years since quittin.2   • Smokeless tobacco: Never Used   Substance Use Topics   • Alcohol use: Yes       Review of Systems    Objective     Rooming Tab(CC,VS,Pt Hx,Fall Screen)  /70   Pulse 85   Temp 96.9 °F (36.1 °C)   Resp 10   Ht 182.9 cm (72\")   Wt 81.7 kg (180 lb 3.2 oz)   SpO2 95%   BMI 24.44 kg/m²     Body mass index is 24.44 kg/m².    Physical Exam  Vitals and nursing note reviewed.   Constitutional:       Appearance: Normal appearance. He is well-developed.   HENT:      Head: Normocephalic and atraumatic.      Right Ear: Tympanic membrane normal.      Left Ear: Tympanic membrane normal.      Nose: No rhinorrhea.      Mouth/Throat:      Pharynx: No posterior oropharyngeal erythema.   Eyes:      Pupils: Pupils are equal, round, and reactive to light.   Cardiovascular:      Rate and Rhythm: Normal rate and regular rhythm.      Pulses: Normal pulses.      Heart sounds: Normal heart sounds. No murmur heard.     Pulmonary:      Effort: Pulmonary effort is normal.      Breath sounds: Normal breath " sounds.   Abdominal:      General: Bowel sounds are normal. There is no distension.      Palpations: Abdomen is soft.   Musculoskeletal:         General: No tenderness.      Cervical back: Normal range of motion and neck supple.   Skin:     Capillary Refill: Capillary refill takes less than 2 seconds.   Neurological:      Mental Status: He is alert and oriented to person, place, and time.   Psychiatric:         Mood and Affect: Mood normal.         Behavior: Behavior normal.          Statin Choice Calculator  Data Reviewed:         The data below has been reviewed by Shanel Bautista MD on 03/18/2021.      Lab Results   Component Value Date    BUN 20 02/11/2021    CREATININE 0.81 02/11/2021    EGFRIFNONA 94 02/11/2021     02/11/2021    K 4.2 02/11/2021     02/11/2021    CALCIUM 9.9 02/11/2021    WBC 4.90 02/11/2021    RBC 4.96 02/11/2021    HCT 46.7 02/11/2021    MCV 94.2 02/11/2021    MCH 32.3 02/11/2021      Assessment/Plan   Order Review Tab  Health Maintenance Tab  Patient Plan/Order Tab  Diagnoses and all orders for this visit:    1. Preventative health care (Primary)  Comments:  discussed all recommendations  Orders:  -     Lipid Panel  -     Comprehensive Metabolic Panel    2. Screen for colon cancer  Comments:  will get it this time  Orders:  -     Amb referral for Screening Colonoscopy    3. Essential hypertension    4. Screening for prostate cancer  -     PSA Screen    5. DDD (degenerative disc disease), cervical  Comments:  will do UDS at next visit  Orders:  -     traMADol (ULTRAM) 50 MG tablet; Take 1 tablet by mouth Every 8 (Eight) Hours As Needed for Moderate Pain  (must last 30 days). Must last 30 days  Dispense: 75 tablet; Refill: 5    Other orders  -     ketoconazole (NIZORAL) 2 % shampoo; Apply  topically to the appropriate area as directed 2 (Two) Times a Week.  Dispense: 1 each; Refill: 6  -     tamsulosin (FLOMAX) 0.4 MG capsule 24 hr capsule; Take 1 capsule by mouth Daily.   Dispense: 60 capsule; Refill: 12  -     diclofenac (VOLTAREN) 75 MG EC tablet; Take 1 tablet by mouth 2 (Two) Times a Day.  Dispense: 60 tablet; Refill: 6  -     Tdap Vaccine Greater Than or Equal To 6yo IM  -     Pneumococcal Polysaccharide Vaccine 23-Valent Greater Than or Equal To 3yo Subcutaneous / IM        Wrapup Tab  Return in about 6 months (around 9/18/2021), or if symptoms worsen or fail to improve.          During this visit for their annual exam, we reviewed their personal history, social history and family history.  We went over their medications and all the recommended health maintenence items for their age group. They were given the opportunity to ask questions and discuss other concerns.

## 2021-04-22 ENCOUNTER — OFFICE VISIT (OUTPATIENT)
Dept: FAMILY MEDICINE CLINIC | Facility: CLINIC | Age: 71
End: 2021-04-22

## 2021-04-22 ENCOUNTER — TELEPHONE (OUTPATIENT)
Dept: FAMILY MEDICINE CLINIC | Facility: CLINIC | Age: 71
End: 2021-04-22

## 2021-04-22 VITALS
TEMPERATURE: 96.9 F | BODY MASS INDEX: 24.24 KG/M2 | SYSTOLIC BLOOD PRESSURE: 134 MMHG | HEIGHT: 72 IN | DIASTOLIC BLOOD PRESSURE: 68 MMHG | WEIGHT: 179 LBS | RESPIRATION RATE: 16 BRPM | HEART RATE: 78 BPM | OXYGEN SATURATION: 96 %

## 2021-04-22 DIAGNOSIS — M54.32 SCIATIC NERVE PAIN, LEFT: Primary | ICD-10-CM

## 2021-04-22 PROCEDURE — 99213 OFFICE O/P EST LOW 20 MIN: CPT | Performed by: PHYSICIAN ASSISTANT

## 2021-04-22 NOTE — PROGRESS NOTES
"Subjective   Pramod Trent is a 70 y.o. male.     Chief Complaint   Patient presents with   • Back Pain   • Hip Pain       /68 (BP Location: Left arm, Patient Position: Sitting, Cuff Size: Adult)   Pulse 78   Temp 96.9 °F (36.1 °C) (Skin)   Resp 16   Ht 182.9 cm (72\")   Wt 81.2 kg (179 lb)   SpO2 96%   BMI 24.28 kg/m²     BP Readings from Last 3 Encounters:   04/22/21 134/68   03/18/21 116/70   10/22/20 132/70       Wt Readings from Last 3 Encounters:   04/22/21 81.2 kg (179 lb)   03/18/21 81.7 kg (180 lb 3.2 oz)   10/22/20 80.3 kg (177 lb)       HPI Back pain for the past 1 week. Acute on chronic back pain. Does have radiation down the left buttock. He is sneezing a lot at this time and this will sometimes cause his upper back to hurt. No radiation down the arms. No weakness in the arms bilaterally. 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 No bladder or bowel incontinence or retention.     The following portions of the patient's history were reviewed and updated as appropriate: allergies, current medications, past family history, past medical history, past social history, past surgical history and problem list.    Review of Systems    Objective   Physical Exam  Constitutional:       Appearance: Normal appearance.   Eyes:      Extraocular Movements: Extraocular movements intact.      Pupils: Pupils are equal, round, and reactive to light.   Cardiovascular:      Rate and Rhythm: Normal rate.      Heart sounds: No murmur heard.     Pulmonary:      Effort: Pulmonary effort is normal.   Musculoskeletal:         General: Tenderness (lower back) present. Normal range of motion.   Neurological:      General: No focal deficit present.      Mental Status: He is alert and oriented to person, place, and time.   Psychiatric:         Mood and Affect: Mood normal.         Behavior: Behavior normal.           Diagnoses and all orders for this visit:    1. Sciatic nerve pain, " left (Primary)  Comments:  No lifting at work for the next 7 days. Heating pad, stretching, and exercises.     I will fill the paper work out when [employer] sends it.     Return if symptoms worsen or fail to improve.

## 2021-04-22 NOTE — PATIENT INSTRUCTIONS
Sciatica Rehab  Ask your health care provider which exercises are safe for you. Do exercises exactly as told by your health care provider and adjust them as directed. It is normal to feel mild stretching, pulling, tightness, or discomfort as you do these exercises. Stop right away if you feel sudden pain or your pain gets worse. Do not begin these exercises until told by your health care provider.  Stretching and range-of-motion exercises  These exercises warm up your muscles and joints and improve the movement and flexibility of your hips and back. These exercises also help to relieve pain, numbness, and tingling.  Sciatic nerve glide  1. Sit in a chair with your head facing down toward your chest. Place your hands behind your back. Let your shoulders slump forward.  2. Slowly straighten one of your legs while you tilt your head back as if you are looking toward the ceiling. Only straighten your leg as far as you can without making your symptoms worse.  3. Hold this position for __________ seconds.  4. Slowly return to the starting position.  5. Repeat with your other leg.  Repeat __________ times. Complete this exercise __________ times a day.  Knee to chest with hip adduction and internal rotation    1. Lie on your back on a firm surface with both legs straight.  2. Bend one of your knees and move it up toward your chest until you feel a gentle stretch in your lower back and buttock. Then, move your knee toward the shoulder that is on the opposite side from your leg. This is hip adduction and internal rotation.  ? Hold your leg in this position by holding on to the front of your knee.  3. Hold this position for __________ seconds.  4. Slowly return to the starting position.  5. Repeat with your other leg.  Repeat __________ times. Complete this exercise __________ times a day.  Prone extension on elbows    1. Lie on your abdomen on a firm surface. A bed may be too soft for this exercise.  2. Prop yourself up on  your elbows.  3. Use your arms to help lift your chest up until you feel a gentle stretch in your abdomen and your lower back.  ? This will place some of your body weight on your elbows. If this is uncomfortable, try stacking pillows under your chest.  ? Your hips should stay down, against the surface that you are lying on. Keep your hip and back muscles relaxed.  4. Hold this position for __________ seconds.  5. Slowly relax your upper body and return to the starting position.  Repeat __________ times. Complete this exercise __________ times a day.  Strengthening exercises  These exercises build strength and endurance in your back. Endurance is the ability to use your muscles for a long time, even after they get tired.  Pelvic tilt  This exercise strengthens the muscles that lie deep in the abdomen.  1. Lie on your back on a firm surface. Bend your knees and keep your feet flat on the floor.  2. Tense your abdominal muscles. Tip your pelvis up toward the ceiling and flatten your lower back into the floor.  ? To help with this exercise, you may place a small towel under your lower back and try to push your back into the towel.  3. Hold this position for __________ seconds.  4. Let your muscles relax completely before you repeat this exercise.  Repeat __________ times. Complete this exercise __________ times a day.  Alternating arm and leg raises    1. Get on your hands and knees on a firm surface. If you are on a hard floor, you may want to use padding, such as an exercise mat, to cushion your knees.  2. Line up your arms and legs. Your hands should be directly below your shoulders, and your knees should be directly below your hips.  3. Lift your left leg behind you. At the same time, raise your right arm and straighten it in front of you.  ? Do not lift your leg higher than your hip.  ? Do not lift your arm higher than your shoulder.  ? Keep your abdominal and back muscles tight.  ? Keep your hips facing the  ground.  ? Do not arch your back.  ? Keep your balance carefully, and do not hold your breath.  4. Hold this position for __________ seconds.  5. Slowly return to the starting position.  6. Repeat with your right leg and your left arm.  Repeat __________ times. Complete this exercise __________ times a day.  Posture and body mechanics  Good posture and healthy body mechanics can help to relieve stress in your body's tissues and joints. Body mechanics refers to the movements and positions of your body while you do your daily activities. Posture is part of body mechanics. Good posture means:  · Your spine is in its natural S-curve position (neutral).  · Your shoulders are pulled back slightly.  · Your head is not tipped forward.  Follow these guidelines to improve your posture and body mechanics in your everyday activities.  Standing    · When standing, keep your spine neutral and your feet about hip width apart. Keep a slight bend in your knees. Your ears, shoulders, and hips should line up.  · When you do a task in which you  one place for a long time, place one foot up on a stable object that is 2-4 inches (5-10 cm) high, such as a footstool. This helps keep your spine neutral.  Sitting    · When sitting, keep your spine neutral and keep your feet flat on the floor. Use a footrest, if necessary, and keep your thighs parallel to the floor. Avoid rounding your shoulders, and avoid tilting your head forward.  · When working at a desk or a computer, keep your desk at a height where your hands are slightly lower than your elbows. Slide your chair under your desk so you are close enough to maintain good posture.  · When working at a computer, place your monitor at a height where you are looking straight ahead and you do not have to tilt your head forward or downward to look at the screen.  Resting  · When lying down and resting, avoid positions that are most painful for you.  · If you have pain with activities  such as sitting, bending, stooping, or squatting, lie in a position in which your body does not bend very much. For example, avoid curling up on your side with your arms and knees near your chest (fetal position).  · If you have pain with activities such as standing for a long time or reaching with your arms, lie with your spine in a neutral position and bend your knees slightly. Try the following positions:  ? Lying on your side with a pillow between your knees.  ? Lying on your back with a pillow under your knees.  Lifting    · When lifting objects, keep your feet at least shoulder width apart and tighten your abdominal muscles.  · Bend your knees and hips and keep your spine neutral. It is important to lift using the strength of your legs, not your back. Do not lock your knees straight out.  · Always ask for help to lift heavy or awkward objects.  This information is not intended to replace advice given to you by your health care provider. Make sure you discuss any questions you have with your health care provider.  Document Revised: 04/10/2020 Document Reviewed: 01/09/2020  Elsevier Patient Education © 2021 [employer]

## 2021-04-22 NOTE — TELEPHONE ENCOUNTER
Patient states that he is needing medical accomodation papers for work due to not being able to lift. Patient states that he was told that PCP has to call and request forms to be faxed. Please call #[phone] to request accomodation forms.

## 2021-04-22 NOTE — TELEPHONE ENCOUNTER
Left VM that [facility] stated pt needs to request forms or provide the office with a case number

## 2021-04-27 ENCOUNTER — TELEPHONE (OUTPATIENT)
Dept: FAMILY MEDICINE CLINIC | Facility: CLINIC | Age: 71
End: 2021-04-27

## 2021-04-27 NOTE — TELEPHONE ENCOUNTER
Caller: JOLLY CABRALES    Relationship to patient: Emergency Contact    Best call back number: [phone]     Chief complaint: DIARREAH  , LOSS OF APPETITE SWOLLEN STOMACH , EYES YELLOW    Type of visit: SAME     Requested date: SOONEST AVAILABLE        Additional notes:  MS. CABRALES SAYS THAT MR IVRIN IS NOT GETTING ANY BETTER SHE WOULD LIKE TO SCHEDULE TO SEE DR. DRIVER AS SOON AS POSSIBLE. EARLIEST FOR DR. DRIVER 5/12/2021.

## 2021-04-28 ENCOUNTER — OFFICE VISIT (OUTPATIENT)
Dept: FAMILY MEDICINE CLINIC | Facility: CLINIC | Age: 71
End: 2021-04-28

## 2021-04-28 VITALS
OXYGEN SATURATION: 97 % | HEART RATE: 96 BPM | TEMPERATURE: 97.5 F | BODY MASS INDEX: 23.73 KG/M2 | SYSTOLIC BLOOD PRESSURE: 116 MMHG | HEIGHT: 72 IN | DIASTOLIC BLOOD PRESSURE: 81 MMHG | WEIGHT: 175.2 LBS | RESPIRATION RATE: 12 BRPM

## 2021-04-28 DIAGNOSIS — K59.1 FUNCTIONAL DIARRHEA: Primary | ICD-10-CM

## 2021-04-28 DIAGNOSIS — M50.30 DDD (DEGENERATIVE DISC DISEASE), CERVICAL: ICD-10-CM

## 2021-04-28 DIAGNOSIS — F10.20 ALCOHOLISM (HCC): ICD-10-CM

## 2021-04-28 PROCEDURE — 99214 OFFICE O/P EST MOD 30 MIN: CPT | Performed by: INTERNAL MEDICINE

## 2021-05-03 DIAGNOSIS — M50.30 DDD (DEGENERATIVE DISC DISEASE), CERVICAL: ICD-10-CM

## 2021-05-03 NOTE — TELEPHONE ENCOUNTER
Caller: XAVIER    Relationship: Other    Best call back number: [phone]    What test/procedure requested:   traMADol (ULTRAM) 50 MG tablet  50 mg, Every 8 Hours PRN         When is it needed: ASAP    Where is the test/procedure going to be performed: NO PROCEDURE    Additional information or concerns: PATIENT'S INSURANCE COMPANY, [employer] [facility] CALLED AND REQUESTED A PRIOR AUTHORIZATION BE SUBMITTED FOR THE PATIENT TO HAVE TRAMADOL COVERED     FAX NUMBER: [phone]

## 2021-05-04 ENCOUNTER — TELEPHONE (OUTPATIENT)
Dept: FAMILY MEDICINE CLINIC | Facility: CLINIC | Age: 71
End: 2021-05-04

## 2021-05-04 NOTE — TELEPHONE ENCOUNTER
Caller: Pramod Cabrales    Relationship: Self    Best call back number: [phone]     What form or medical record are you requesting: RETURN TO WORK     Who is requesting this form or medical record from you: PRAMOD CABRALES     How would you like to receive the form or medical records (pick-up, mail, fax):  If mail, what is the address:   [email or web address]   FULL NAME: PRAMOD CABRALES  CASE #: [id number]  RELEASE WITHOUT RESTRICTIONS      Timeframe paperwork needed: AS SOON AS POSSIBLE      Additional notes:     MR. CABRALES SAYS THAT [employer] IS NEEDING A RETURN TO WORK FORM EMAILED STATING THAT HE CAN RETURN TO WORK 5/2/2021, WITH NO RESTRICTIONS. HE HAS ALREADY STARTED BACK WORK, BUT IS NEEDING THIS FOR RE INSTATEMENT         [email or web address]   FULL NAME: PRAMOD CABRALES  CASE #: [id number]  RELEASE WITHOUT RESTRICTIONS  [facility](EMPLOYEE RESOURCE CENTER)    (P)#[phone] OPTION 1 LEAVE OF [employer] ACCOMIDATION TEAM     (F) [phone]

## 2021-05-05 RX ORDER — TRAMADOL HYDROCHLORIDE 50 MG/1
50 TABLET ORAL EVERY 8 HOURS PRN
Qty: 75 TABLET | Refills: 5 | Status: SHIPPED | OUTPATIENT
Start: 2021-05-05 | End: 2021-10-18

## 2021-05-05 NOTE — TELEPHONE ENCOUNTER
[facility] CALLED TO CHECK ON PRIOR AUTH FOR traMADol (ULTRAM) 50 MG tablet    CASE # [id number]      [facility] CONTACT # [phone]

## 2021-05-05 NOTE — TELEPHONE ENCOUNTER
I SPOKE TO PATIENT AND HE GAVE US HIS UP TO DATE CARD ON 2/11/21. HE SAID THE ONLY DIFFERENCE HE KNOWS OF IS THAT ITS OK TO GET ALL OF RX AT [facility] EXCEPT HE WAS TOLD THE TRAMADOL NEEDS TO BE FILLED AT [facility]. HIS PREFERRED LOCATION IS [facility]

## 2021-05-05 NOTE — TELEPHONE ENCOUNTER
Ok so we need to resend to evelio its pended    fyi  When I do a pa with that ins card it states pt is inactive

## 2021-05-16 PROBLEM — F10.20 ALCOHOLISM: Status: ACTIVE | Noted: 2021-05-16

## 2021-05-16 NOTE — ASSESSMENT & PLAN NOTE
Psychological condition is unchanged.  recommend stop drinking  Psychological condition  will be reassessed at the next regular appointment.

## 2021-07-06 ENCOUNTER — APPOINTMENT (OUTPATIENT)
Dept: GENERAL RADIOLOGY | Facility: HOSPITAL | Age: 71
End: 2021-07-06

## 2021-07-06 ENCOUNTER — HOSPITAL ENCOUNTER (EMERGENCY)
Facility: HOSPITAL | Age: 71
Discharge: HOME | End: 2021-07-06
Attending: EMERGENCY MEDICINE | Admitting: EMERGENCY MEDICINE

## 2021-07-06 ENCOUNTER — NURSE TRIAGE (OUTPATIENT)
Dept: CALL CENTER | Facility: HOSPITAL | Age: 71
End: 2021-07-06

## 2021-07-06 VITALS
SYSTOLIC BLOOD PRESSURE: 120 MMHG | HEART RATE: 106 BPM | TEMPERATURE: 98.7 F | WEIGHT: 173.72 LBS | HEIGHT: 72 IN | RESPIRATION RATE: 17 BRPM | OXYGEN SATURATION: 97 % | DIASTOLIC BLOOD PRESSURE: 85 MMHG | BODY MASS INDEX: 23.53 KG/M2

## 2021-07-06 DIAGNOSIS — S22.42XA CLOSED FRACTURE OF MULTIPLE RIBS OF LEFT SIDE, INITIAL ENCOUNTER: Primary | ICD-10-CM
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
## 2021-07-06 PROCEDURE — 71101 X-RAY EXAM UNILAT RIBS/CHEST: CPT

## 2021-07-06 PROCEDURE — 99283 EMERGENCY DEPT VISIT LOW MDM: CPT

## 2021-07-06 RX ORDER — HYDROCODONE BITARTRATE AND ACETAMINOPHEN 7.5; 325 MG/1; MG/1
1 TABLET ORAL ONCE
Status: COMPLETED | OUTPATIENT
Start: 2021-07-06 | End: 2021-07-06

## 2021-07-06 RX ORDER — HYDROCODONE BITARTRATE AND ACETAMINOPHEN 7.5; 325 MG/1; MG/1
1-2 TABLET ORAL EVERY 6 HOURS PRN
Qty: 15 TABLET | Refills: 0 | Status: SHIPPED | OUTPATIENT
Start: 2021-07-06 | End: 2021-07-07 | Stop reason: SDUPTHER

## 2021-07-06 RX ADMIN — HYDROCODONE BITARTRATE AND ACETAMINOPHEN 1 TABLET: 7.5; 325 TABLET ORAL at 22:39

## 2021-07-06 NOTE — TELEPHONE ENCOUNTER
Fell off extension ladder on Saturday and fell on his back. Having back pain, care advice given wants to be seen at MD office He says he will need work excuse and PCP will be the one he should see.  Warm transfer to MD office Spoke with Dulce. Call transferred.    Reason for Disposition  • [1] SEVERE pain (e.g., excruciating) AND [2] not improved 2 hours after pain medicine/ice packs    Additional Information  • Negative: Dangerous mechanism of injury (e.g., MVA, contact sports, trampoline, diving, fall > 10 feet or 3 meters)  (Exception: back pain began > 1 hour after injury)  • Negative: [1] Weakness (i.e., paralysis, loss of muscle strength) of the leg(s) or foot AND [2] sudden onset after back injury  • Negative: [1] Numbness (i.e., loss of sensation) of the leg(s) or foot AND [2] sudden onset after back injury  • Negative: [1] Major bleeding (e.g., actively dripping or spurting) AND [2] can't be stopped  • Negative: Bullet wound, knife wound, or other penetrating object  • Negative: Shock suspected (e.g., cold/pale/clammy skin, too weak to stand, low BP, rapid pulse)  • Negative: Sounds like a life-threatening emergency to the triager  • Negative: [1] Injuries at more than 1 site AND [2] unsure which guideline to use  • Negative: Injury to the neck  • Negative: Injury to the tailbone  • Negative: Back pain not from an injury  • Negative: Back pain from overuse (work, exercise, gardening) OR from twisting, lifting, or bending injury  • Negative: [1] SEVERE PAIN in kidney area (flank) AND [2] follows direct blow to that site  • Negative: Blood in urine (red, pink, or tea-colored)  • Negative: [1] Unable to urinate (or only a few drops) > 4 hours AND [2] bladder feels very full (e.g., palpable bladder or strong urge to urinate)  • Negative: [1] Loss of bladder or bowel control (urine or bowel incontinence; wetting self, leaking stool) AND [2] new-onset  • Negative: Numbness (loss of sensation) in groin or rectal  "area  • Negative: Skin is split open or gaping  (or length > 1/2 inch or 12 mm)  • Negative: Puncture wound of back  • Negative: [1] Bleeding AND [2] won't stop after 10 minutes of direct pressure (using correct technique)  • Negative: Sounds like a serious injury to the triager  • Negative: Weakness of a leg or foot (e.g., unable to bear weight, dragging foot)  • Negative: Numbness of a leg or foot (i.e.., loss of sensation)    Answer Assessment - Initial Assessment Questions  1. MECHANISM: \"How did the injury happen?\" (Consider the possibility of domestic violence or elder abuse)    Fell of extension ladder on Saturday at home 5 or 6 feet high  2. ONSET: \"When did the injury happen?\" (Minutes or hours ago)   fell off ladder  3. LOCATION: \"What part of the back is injured?\"  Right below rib cage on right side lower middle back apin whole left side hurts  4. SEVERITY: \"Can you move the back normally?\"   can walk but not normally  5. PAIN: \"Is there any pain?\" If Yes, ask: \"How bad is the pain?\"   (Scale 1-10; or mild, moderate, severe)   can get to a 10  Now 2-3/10  6. CORD SYMPTOMS: Any weakness or numbness of the arms or legs?\"   left arm hurts  7. SIZE: For cuts, bruises, or swelling, ask: \"How large is it?\" (e.g., inches or centimeters)  None that he can see  8. TETANUS: For any breaks in the skin, ask: \"When was the last tetanus booster?\"  denies  9. OTHER SYMPTOMS: \"Do you have any other symptoms?\" (e.g., abdominal pain, blood in urine)      *No Answer*  10. PREGNANCY: \"Is there any chance you are pregnant?\" \"When was your last menstrual period?\"  na    Protocols used: BACK INJURY-ADULT-AH      "

## 2021-07-07 ENCOUNTER — OFFICE VISIT (OUTPATIENT)
Dept: FAMILY MEDICINE CLINIC | Facility: CLINIC | Age: 71
End: 2021-07-07

## 2021-07-07 VITALS
SYSTOLIC BLOOD PRESSURE: 130 MMHG | BODY MASS INDEX: 23.7 KG/M2 | DIASTOLIC BLOOD PRESSURE: 80 MMHG | HEART RATE: 93 BPM | HEIGHT: 72 IN | OXYGEN SATURATION: 95 % | WEIGHT: 175 LBS | TEMPERATURE: 97.5 F
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
## 2021-07-07 DIAGNOSIS — S22.42XA CLOSED FRACTURE OF MULTIPLE RIBS OF LEFT SIDE, INITIAL ENCOUNTER: ICD-10-CM

## 2021-07-07 PROCEDURE — 99213 OFFICE O/P EST LOW 20 MIN: CPT | Performed by: HOSPITALIST

## 2021-07-07 RX ORDER — HYDROCODONE BITARTRATE AND ACETAMINOPHEN 7.5; 325 MG/1; MG/1
1 TABLET ORAL EVERY 8 HOURS PRN
Qty: 15 TABLET | Refills: 0 | Status: SHIPPED | OUTPATIENT
Start: 2021-07-07 | End: 2021-10-28

## 2021-07-07 NOTE — PROGRESS NOTES
"Subjective   Pramod Trent is a 71 y.o. male.     Subjective / HPI  Patient feel sore in left side of chest, worse when cough or take deep breath but says improved as compare to yesterday, no fever.     Review of Systems    Objective     /80 (BP Location: Right arm, Patient Position: Sitting, Cuff Size: Adult)   Pulse 93   Temp 97.5 °F (36.4 °C) (Skin)   Ht 182.9 cm (72\")   Wt 79.4 kg (175 lb)   SpO2 95%   BMI 23.73 kg/m²      Physical Exam  Vitals and nursing note reviewed.   Constitutional:       General: He is not in acute distress.     Appearance: Normal appearance. He is well-developed. He is not ill-appearing, toxic-appearing or diaphoretic.   HENT:      Head: Normocephalic and atraumatic.      Right Ear: Ear canal and external ear normal.      Left Ear: Ear canal and external ear normal.      Nose: Nose normal. No congestion or rhinorrhea.      Mouth/Throat:      Mouth: Mucous membranes are moist.      Pharynx: No oropharyngeal exudate.   Eyes:      General: No scleral icterus.        Right eye: No discharge.         Left eye: No discharge.      Extraocular Movements: Extraocular movements intact.      Conjunctiva/sclera: Conjunctivae normal.      Pupils: Pupils are equal, round, and reactive to light.   Neck:      Thyroid: No thyromegaly.      Vascular: No carotid bruit or JVD.      Trachea: No tracheal deviation.   Cardiovascular:      Rate and Rhythm: Normal rate and regular rhythm.      Pulses: Normal pulses.      Heart sounds: Normal heart sounds. No murmur heard.   No friction rub. No gallop.    Pulmonary:      Effort: Pulmonary effort is normal. No respiratory distress.      Breath sounds: Normal breath sounds. No stridor. No wheezing, rhonchi or rales.   Chest:      Chest wall: No tenderness.   Abdominal:      General: Bowel sounds are normal. There is no distension.      Palpations: Abdomen is soft. There is no mass.      Tenderness: There is no abdominal tenderness. There is no guarding " or rebound.      Hernia: No hernia is present.   Musculoskeletal:         General: No swelling, tenderness, deformity or signs of injury. Normal range of motion.      Cervical back: Normal range of motion and neck supple. No rigidity. No muscular tenderness.      Right lower leg: No edema.      Left lower leg: No edema.   Lymphadenopathy:      Cervical: No cervical adenopathy.   Skin:     General: Skin is warm and dry.      Coloration: Skin is not jaundiced or pale.      Findings: No bruising, erythema or rash.   Neurological:      General: No focal deficit present.      Mental Status: He is alert and oriented to person, place, and time. Mental status is at baseline.      Cranial Nerves: No cranial nerve deficit.      Sensory: No sensory deficit.      Motor: No weakness or abnormal muscle tone.      Coordination: Coordination normal.   Psychiatric:         Mood and Affect: Mood normal.         Behavior: Behavior normal.         Thought Content: Thought content normal.         Judgment: Judgment normal.         Procedures       Assessment/Plan   Diagnoses and all orders for this visit:    1. Fall, initial encounter (Primary)  Comments:  from ladder, mechanical in nature.     2. Closed fracture of multiple ribs of left side, initial encounter  Comments:  Pain prescription given, advised rest. work off letter will be given.  Orders:  -     HYDROcodone-acetaminophen (NORCO) 7.5-325 MG per tablet; Take 1 tablet by mouth Every 8 (Eight) Hours As Needed for Moderate Pain .  Dispense: 15 tablet; Refill: 0

## 2021-07-07 NOTE — ED TRIAGE NOTES
Pt reports fe;; from ladder about 6 feet onto ground with gravel on left side on Saturday, c/o lt rib, lt shoulder pain.  Pt reports hard to take a deep breath d/t pain

## 2021-07-07 NOTE — ED PROVIDER NOTES
Subjective   Patient is a 8-year-old male who had fallen off a ladder.  Complains of pain to his left ribs.  He denies head pain neck pain shortness of breath or other complaint of injury          Review of Systems  Negative for head pain neck pain shortness of breath abdominal pain extremity pain or other complaint of injury  Past Medical History:   Diagnosis Date   • Anxiety 10/24/2019   • Chronic pain syndrome 10/24/2019   • DDD (degenerative disc disease), cervical 10/24/2019   • Erectile dysfunction due to diseases classified elsewhere 10/24/2019   • Post-traumatic osteoarthritis of one knee, right 2018    Overview:  Added automatically from request for surgery [id number]       Allergies   Allergen Reactions   • Morphine Hives     RASH,FEVER   • Penicillins Rash     RASH,FEVER       Past Surgical History:   Procedure Laterality Date   • REPLACEMENT TOTAL KNEE Right    • TONSILLECTOMY         Family History   Problem Relation Age of Onset   • Hypertension Mother        Social History     Socioeconomic History   • Marital status:      Spouse name: Not on file   • Number of children: Not on file   • Years of education: Not on file   • Highest education level: Not on file   Tobacco Use   • Smoking status: Former Smoker     Packs/day: 1.00     Years: 30.00     Pack years: 30.00     Types: Cigarettes     Quit date:      Years since quittin.5   • Smokeless tobacco: Former User     Types: Chew   Vaping Use   • Vaping Use: Never used   Substance and Sexual Activity   • Alcohol use: Yes   • Drug use: Never   • Sexual activity: Defer           Objective   Physical Exam  Chest exam shows pain in palpation of his left posterior lateral ribs.  There is no crepitus or subcu air.  Lungs are clear.  Abdomen soft nontender.  He has no spinous tenderness.  Procedures           ED Course      XR Ribs Left With PA Chest    Result Date: 2021  1.Suspected nondisplaced fractures of the left anterior-lateral sixth  and seventh ribs. 2.Bibasilar opacities which may represent atelectasis.  Electronically Signed By-Juancho Seo MD On:7/6/2021 10:15 PM This report was finalized on [id number] by  Juancho Seo MD.                                         MDM  Number of Diagnoses or Management Options  Diagnosis management comments: Patient has findings consistent with multiple left rib fractures.  Will be discharged with a prescription for hydrocodone.  We will see his MD for recheck.       Amount and/or Complexity of Data Reviewed  Tests in the radiology section of CPT®: reviewed    Risk of Complications, Morbidity, and/or Mortality  Presenting problems: moderate  Diagnostic procedures: moderate  Management options: moderate    Patient Progress  Patient progress: stable      Final diagnoses:   Closed fracture of multiple ribs of left side, initial encounter   Fall, initial encounter       ED Disposition  ED Disposition     ED Disposition Condition Comment    Discharge Stable           No follow-up provider specified.       Medication List      New Prescriptions    HYDROcodone-acetaminophen 7.5-325 MG per tablet  Commonly known as: NORCO  Take 1-2 tablets by mouth Every 6 (Six) Hours As Needed for Moderate Pain .           Where to Get Your Medications      These medications were sent to [facility] [address] [place] [address] - [address] [place] [address] - [phone]  - [phone] [place] [address]    Phone: [phone]   · HYDROcodone-acetaminophen 7.5-325 MG per tablet          Braeden Villa MD  07/06/21 8897

## 2021-10-11 RX ORDER — DICLOFENAC SODIUM 75 MG/1
TABLET, DELAYED RELEASE ORAL
Qty: 60 TABLET | Refills: 0 | Status: SHIPPED | OUTPATIENT
Start: 2021-10-11 | End: 2021-10-28 | Stop reason: SDUPTHER

## 2021-10-17 DIAGNOSIS — M50.30 DDD (DEGENERATIVE DISC DISEASE), CERVICAL: ICD-10-CM

## 2021-10-18 RX ORDER — TRAMADOL HYDROCHLORIDE 50 MG/1
TABLET ORAL
Qty: 39 TABLET | Refills: 0 | Status: SHIPPED | OUTPATIENT
Start: 2021-10-18 | End: 2021-11-10

## 2021-10-28 ENCOUNTER — OFFICE VISIT (OUTPATIENT)
Dept: FAMILY MEDICINE CLINIC | Facility: CLINIC | Age: 71
End: 2021-10-28

## 2021-10-28 VITALS
HEART RATE: 71 BPM | OXYGEN SATURATION: 97 % | DIASTOLIC BLOOD PRESSURE: 68 MMHG | RESPIRATION RATE: 16 BRPM | HEIGHT: 72 IN | BODY MASS INDEX: 23.98 KG/M2 | TEMPERATURE: 97.7 F | WEIGHT: 177 LBS | SYSTOLIC BLOOD PRESSURE: 124 MMHG

## 2021-10-28 DIAGNOSIS — F41.9 ANXIETY: ICD-10-CM

## 2021-10-28 DIAGNOSIS — M17.31 POST-TRAUMATIC OSTEOARTHRITIS OF ONE KNEE, RIGHT: ICD-10-CM

## 2021-10-28 DIAGNOSIS — Z23 NEED FOR IMMUNIZATION AGAINST INFLUENZA: Primary | ICD-10-CM

## 2021-10-28 DIAGNOSIS — G89.4 CHRONIC PAIN SYNDROME: ICD-10-CM

## 2021-10-28 DIAGNOSIS — I10 ESSENTIAL HYPERTENSION: ICD-10-CM

## 2021-10-28 PROCEDURE — 90662 IIV NO PRSV INCREASED AG IM: CPT | Performed by: INTERNAL MEDICINE

## 2021-10-28 PROCEDURE — 90471 IMMUNIZATION ADMIN: CPT | Performed by: INTERNAL MEDICINE

## 2021-10-28 PROCEDURE — 99214 OFFICE O/P EST MOD 30 MIN: CPT | Performed by: INTERNAL MEDICINE

## 2021-10-28 RX ORDER — DICLOFENAC SODIUM 75 MG/1
75 TABLET, DELAYED RELEASE ORAL 2 TIMES DAILY
Qty: 60 TABLET | Refills: 5 | Status: SHIPPED | OUTPATIENT
Start: 2021-10-28 | End: 2022-06-20

## 2021-10-28 RX ORDER — FLUVOXAMINE MALEATE 100 MG/1
1 CAPSULE, EXTENDED RELEASE ORAL DAILY
Qty: 30 EACH | Refills: 6 | Status: SHIPPED | OUTPATIENT
Start: 2021-10-28 | End: 2023-01-31

## 2021-10-28 RX ORDER — FLUCONAZOLE 200 MG/1
TABLET ORAL
COMMUNITY
Start: 2021-10-16 | End: 2022-06-20

## 2021-11-10 DIAGNOSIS — M50.30 DDD (DEGENERATIVE DISC DISEASE), CERVICAL: ICD-10-CM

## 2021-11-10 RX ORDER — TRAMADOL HYDROCHLORIDE 50 MG/1
TABLET ORAL
Qty: 35 TABLET | Refills: 0 | Status: SHIPPED | OUTPATIENT
Start: 2021-11-10 | End: 2021-12-14

## 2021-12-13 DIAGNOSIS — M50.30 DDD (DEGENERATIVE DISC DISEASE), CERVICAL: ICD-10-CM

## 2021-12-14 RX ORDER — TRAMADOL HYDROCHLORIDE 50 MG/1
TABLET ORAL
Qty: 35 TABLET | Refills: 0 | Status: SHIPPED | OUTPATIENT
Start: 2021-12-14 | End: 2022-02-11 | Stop reason: SDUPTHER

## 2022-02-11 ENCOUNTER — LAB (OUTPATIENT)
Dept: FAMILY MEDICINE CLINIC | Facility: CLINIC | Age: 72
End: 2022-02-11

## 2022-02-11 ENCOUNTER — OFFICE VISIT (OUTPATIENT)
Dept: FAMILY MEDICINE CLINIC | Facility: CLINIC | Age: 72
End: 2022-02-11

## 2022-02-11 VITALS
HEIGHT: 72 IN | TEMPERATURE: 97.1 F | HEART RATE: 74 BPM | WEIGHT: 177.6 LBS | SYSTOLIC BLOOD PRESSURE: 130 MMHG | RESPIRATION RATE: 16 BRPM | BODY MASS INDEX: 24.06 KG/M2 | DIASTOLIC BLOOD PRESSURE: 88 MMHG | OXYGEN SATURATION: 98 %

## 2022-02-11 DIAGNOSIS — K59.1 FUNCTIONAL DIARRHEA: Primary | ICD-10-CM

## 2022-02-11 DIAGNOSIS — M50.30 DDD (DEGENERATIVE DISC DISEASE), CERVICAL: ICD-10-CM

## 2022-02-11 DIAGNOSIS — R10.31 RIGHT LOWER QUADRANT ABDOMINAL PAIN: ICD-10-CM

## 2022-02-11 DIAGNOSIS — F41.9 ANXIETY: ICD-10-CM

## 2022-02-11 DIAGNOSIS — F10.20 ALCOHOLISM: ICD-10-CM

## 2022-02-11 DIAGNOSIS — I10 ESSENTIAL HYPERTENSION: ICD-10-CM

## 2022-02-11 PROCEDURE — 99214 OFFICE O/P EST MOD 30 MIN: CPT | Performed by: INTERNAL MEDICINE

## 2022-02-11 PROCEDURE — 81003 URINALYSIS AUTO W/O SCOPE: CPT | Performed by: INTERNAL MEDICINE

## 2022-02-11 RX ORDER — TRAMADOL HYDROCHLORIDE 50 MG/1
50 TABLET ORAL EVERY 12 HOURS PRN
Qty: 50 TABLET | Refills: 2 | Status: SHIPPED | OUTPATIENT
Start: 2022-02-11 | End: 2022-04-22 | Stop reason: SDUPTHER

## 2022-02-11 RX ORDER — METRONIDAZOLE 500 MG/1
500 TABLET ORAL 3 TIMES DAILY
Qty: 21 TABLET | Refills: 0 | Status: SHIPPED | OUTPATIENT
Start: 2022-02-11 | End: 2022-06-20

## 2022-02-14 LAB
BILIRUB BLD-MCNC: NEGATIVE MG/DL
CLARITY, POC: CLEAR
COLOR UR: YELLOW
EXPIRATION DATE: ABNORMAL
GLUCOSE UR STRIP-MCNC: NEGATIVE MG/DL
KETONES UR QL: NEGATIVE
LEUKOCYTE EST, POC: NEGATIVE
Lab: ABNORMAL
NITRITE UR-MCNC: NEGATIVE MG/ML
PH UR: 8.5 [PH] (ref 5–8)
PROT UR STRIP-MCNC: ABNORMAL MG/DL
RBC # UR STRIP: NEGATIVE /UL
SP GR UR: 1.02 (ref 1–1.03)
UROBILINOGEN UR QL: NORMAL

## 2022-04-11 RX ORDER — TAMSULOSIN HYDROCHLORIDE 0.4 MG/1
CAPSULE ORAL
Qty: 90 CAPSULE | Refills: 0 | Status: SHIPPED | OUTPATIENT
Start: 2022-04-11 | End: 2022-09-06 | Stop reason: SDUPTHER

## 2022-04-11 NOTE — TELEPHONE ENCOUNTER
Caller: JOLLY CABRALES    Relationship to patient: Emergency Contact    Best call back number: [phone]    Patient is needing: PATIENT'S WIFE CALLED AND SAID THAT THE [facility] IS [place] CALLED HER AND SAID THAT THE MEDICATION REFILL REQUEST FOR FLOMAX HAD BEEN DENIED BY THE DOCTOR    HUB ADVISED IT HAD BEEN APPROVED FOR REFILL, PATIENT'S WIFE SAID SHE WOULD CONTACT THE PHARMACY AGAIN    PATIENT'S WIFE IS LISTED ON HIS  VERBAL

## 2022-04-22 DIAGNOSIS — M50.30 DDD (DEGENERATIVE DISC DISEASE), CERVICAL: ICD-10-CM

## 2022-04-22 RX ORDER — TRAMADOL HYDROCHLORIDE 50 MG/1
50 TABLET ORAL EVERY 12 HOURS PRN
Qty: 50 TABLET | Refills: 2 | Status: SHIPPED | OUTPATIENT
Start: 2022-04-22 | End: 2022-06-17

## 2022-04-22 NOTE — TELEPHONE ENCOUNTER
Caller: ERIKA CABRALESA    Relationship: Emergency Contact    Best call back number:[phone]    Requested Prescriptions:   Requested Prescriptions     Pending Prescriptions Disp Refills   • traMADol (ULTRAM) 50 MG tablet 50 tablet 2     Sig: Take 1 tablet by mouth Every 12 (Twelve) Hours As Needed for Moderate Pain .        Pharmacy where request should be sent: [address] [phone] [facility] [phone] [facility]       Does the patient have less than a 3 day supply:  [x] Yes  [] No    Beckie Stock Rep   04/22/22 12:37 EDT

## 2022-06-08 ENCOUNTER — TELEPHONE (OUTPATIENT)
Dept: FAMILY MEDICINE CLINIC | Facility: CLINIC | Age: 72
End: 2022-06-08

## 2022-06-08 NOTE — TELEPHONE ENCOUNTER
Hub staff attempted to follow warm transfer process and was unsuccessful     Caller: JOLLY CABRALES    Relationship to patient: Emergency Contact    Best call back number: [phone]    Patient is needing: POST OF APPOINTMENT TWO WEEKS FROM 6/8/22

## 2022-06-17 ENCOUNTER — LAB (OUTPATIENT)
Dept: FAMILY MEDICINE CLINIC | Facility: CLINIC | Age: 72
End: 2022-06-17

## 2022-06-17 ENCOUNTER — OFFICE VISIT (OUTPATIENT)
Dept: FAMILY MEDICINE CLINIC | Facility: CLINIC | Age: 72
End: 2022-06-17

## 2022-06-17 VITALS
SYSTOLIC BLOOD PRESSURE: 100 MMHG | OXYGEN SATURATION: 100 % | BODY MASS INDEX: 19.1 KG/M2 | WEIGHT: 141 LBS | HEIGHT: 72 IN | DIASTOLIC BLOOD PRESSURE: 60 MMHG | HEART RATE: 110 BPM | TEMPERATURE: 98 F | RESPIRATION RATE: 18 BRPM

## 2022-06-17 DIAGNOSIS — E87.6 HYPOKALEMIA: ICD-10-CM

## 2022-06-17 DIAGNOSIS — K56.690 OTHER PARTIAL INTESTINAL OBSTRUCTION: Primary | ICD-10-CM

## 2022-06-17 DIAGNOSIS — M21.372 LEFT FOOT DROP: ICD-10-CM

## 2022-06-17 DIAGNOSIS — R63.4 WEIGHT LOSS: ICD-10-CM

## 2022-06-17 DIAGNOSIS — R53.1 WEAKNESS: ICD-10-CM

## 2022-06-17 DIAGNOSIS — G89.4 CHRONIC PAIN SYNDROME: ICD-10-CM

## 2022-06-17 PROCEDURE — 80053 COMPREHEN METABOLIC PANEL: CPT | Performed by: INTERNAL MEDICINE

## 2022-06-17 PROCEDURE — 99214 OFFICE O/P EST MOD 30 MIN: CPT | Performed by: INTERNAL MEDICINE

## 2022-06-17 PROCEDURE — 84134 ASSAY OF PREALBUMIN: CPT | Performed by: INTERNAL MEDICINE

## 2022-06-17 PROCEDURE — 82607 VITAMIN B-12: CPT | Performed by: INTERNAL MEDICINE

## 2022-06-17 PROCEDURE — 82746 ASSAY OF FOLIC ACID SERUM: CPT | Performed by: INTERNAL MEDICINE

## 2022-06-17 PROCEDURE — 36415 COLL VENOUS BLD VENIPUNCTURE: CPT | Performed by: INTERNAL MEDICINE

## 2022-06-17 PROCEDURE — 85025 COMPLETE CBC W/AUTO DIFF WBC: CPT | Performed by: INTERNAL MEDICINE

## 2022-06-17 RX ORDER — TIZANIDINE 4 MG/1
4 TABLET ORAL 2 TIMES DAILY PRN
Qty: 45 TABLET | Refills: 1 | Status: SHIPPED | OUTPATIENT
Start: 2022-06-17 | End: 2022-12-09

## 2022-06-17 RX ORDER — FAMOTIDINE 20 MG/1
20 TABLET, FILM COATED ORAL
COMMUNITY
Start: 2022-05-24 | End: 2022-06-20

## 2022-06-17 RX ORDER — GARLIC EXTRACT 500 MG
CAPSULE ORAL
COMMUNITY
Start: 2022-06-02 | End: 2022-07-03 | Stop reason: HOSPADM

## 2022-06-17 RX ORDER — HYDROCODONE BITARTRATE AND ACETAMINOPHEN 5; 325 MG/1; MG/1
1 TABLET ORAL
COMMUNITY
Start: 2022-06-13 | End: 2022-06-26 | Stop reason: SDUPTHER

## 2022-06-17 NOTE — ASSESSMENT & PLAN NOTE
- I advised the patient to use the walker and to not drive until physical therapy has worked with him.  - I will order blood tests today. Depending on the results, we may need to do another potassium pill or a calcium pill.  - I advised him to continue including protein in his diet.

## 2022-06-17 NOTE — ASSESSMENT & PLAN NOTE
- I will refer the patient to physical therapy.  - I will prescribe tizanidine twice a day as needed.

## 2022-06-17 NOTE — PROGRESS NOTES
Rooming Tab(CC,VS,Pt Hx,Fall Screen)  Chief Complaint   Patient presents with   • Hospital Follow Up Visit     Post op       Subjective   HPI  Pramod Trent is a 72-year-old male who presents for a follow-up. He is accompanied by his wife who also provides history.    Other partial intestinal obstruction (HCC)   The patient reports his diarrhea started back in 05/2022 after surgery. He recalls during his last visit he could not get a CT scan due to having no insurance. The patient had a blockage in his lower small intestine, which they removed. He reports the blockage was aggravating his bladder. His wife reports the patient's diarrhea has been a recurring issue that has gone on and off for a while before the hospital. He reports his abdominal pain is not too bad, and notes he has started having regular bowel movements. He denies having either constipation or loose stool at this time. The patient states he urinates in a bottle and uses it constantly, secondary to not being able to urinate in the bathroom. He reports experiencing dysuria that burns.     Neck pain  The patient reports constant pain in his neck with difficulty turning his head. He notes experiencing pain in his lower back towards the left side more than the right. The patient reports receiving pain medication on 06/15/2022 to help his neck and back pain. He confirms he has not taken any tramadol since the surgery and was told not to take diclofenac. The patient denies driving vehicles currently and lives in a one-story home. He climbs four steps to the garage with railings. The patient states he does not have a chair or a bench in his shower, however there are handlebars. He states he does not get tired when he is showering. His wife states after the patient takes a shower, he gets his clothes on and then goes to the bed to lay down for a while because he is exhausted.     Left foot drop  His wife states the patient walks with an abnormal gait in  "\"dropping out\" his left foot. She notes his gait started after the hospital, and has happened consistently for 2 weeks. He states he wishes to do physical therapy at home.     Depression  His wife notes concern that the patient is getting depressed. The patient confirms feeling dependent on everything when he was used to doing things on his own. The patient notes he prays to help cope when needed. He denies smoking or consuming any alcohol.     Health maintenance  The patient reports he has lost 30 pounds within 3 weeks. He confirms he is not doing physical therapy. From previous labs, the patient has a history of low potassium, protein, and sodium. He reports consuming full glasses of protein drinks approximately 3 to 4 times per day. The patient denies taking fluvoxamine regularly because he feels like it does not help. His wife reports the patient is sleeping excessively. His wife also reports the patient experienced fevers several times and measured a blood pressure of 140/80 mmHg last night; which normalized at a systolic 117 mmHg some time after. He does not have a history of having the COVID-19 infection.     I have reviewed and updated his medications, medical history and problem list during today's office visit.     Patient Care Team:  Shanel Bautista MD as PCP - General (Internal Medicine)    Problem List Tab  Medications Tab  Synopsis Tab  Chart Review Tab  Care Everywhere Tab  Immunizations Tab  Patient History Tab    Social History     Tobacco Use   • Smoking status: Former Smoker     Packs/day: 1.00     Years: 30.00     Pack years: 30.00     Types: Cigarettes, Cigarettes     Quit date: 2002     Years since quittin.4   • Smokeless tobacco: Former User     Types: Chew   Substance Use Topics   • Alcohol use: Yes       Review of Systems   A review of systems was performed, and the pertinent positives are noted in the HPI.    Objective     Rooming Tab(CC,VS,Pt Hx,Fall Screen)  /60 (BP " "Location: Right arm, Patient Position: Sitting, Cuff Size: Adult)   Pulse 110   Temp 98 °F (36.7 °C) (Infrared)   Resp 18   Ht 182.9 cm (72\")   Wt 64 kg (141 lb)   SpO2 100%   BMI 19.12 kg/m²     Body mass index is 19.12 kg/m².    Physical Exam  Vitals and nursing note reviewed.   Constitutional:       Appearance: He is well-developed. He is ill-appearing.      Comments: Cachetic with temporal wasting   HENT:      Head: Normocephalic and atraumatic.      Right Ear: Tympanic membrane normal.      Left Ear: Tympanic membrane normal.      Nose: No rhinorrhea.      Mouth/Throat:      Pharynx: No posterior oropharyngeal erythema.   Eyes:      Pupils: Pupils are equal, round, and reactive to light.   Cardiovascular:      Rate and Rhythm: Normal rate and regular rhythm.      Pulses: Normal pulses.      Heart sounds: Normal heart sounds. No murmur heard.  Pulmonary:      Effort: Pulmonary effort is normal.      Breath sounds: Normal breath sounds.   Abdominal:      General: Bowel sounds are normal. There is no distension.      Palpations: Abdomen is soft.      Comments: There is some tenderness to palpation.   Musculoskeletal:         General: No tenderness. Normal range of motion.      Cervical back: Normal range of motion and neck supple.   Skin:     Capillary Refill: Capillary refill takes less than 2 seconds.      Coloration: Skin is pale.      Findings: Bruising present.   Neurological:      Mental Status: He is alert and oriented to person, place, and time.      Motor: Weakness present.      Gait: Gait abnormal.      Comments: Tremor  Left foot drop   Psychiatric:         Mood and Affect: Mood normal.         Behavior: Behavior normal.          Statin Choice Calculator  Data Reviewed:         The data below has been reviewed by Shanel Bautista MD on 06/17/2022.      Lab Results   Component Value Date    BUN 13 06/17/2022    CREATININE 0.55 (L) 06/17/2022     (L) 06/17/2022    K 3.9 06/17/2022    K " 4.0 06/01/2022    CL 91 (L) 06/17/2022    CALCIUM 9.0 06/17/2022    ALBUMIN 3.00 (L) 06/17/2022    BILITOT 0.2 06/17/2022    ALKPHOS 180 (H) 06/17/2022    AST 41 (H) 06/17/2022    ALT 36 06/17/2022    WBC 9.56 06/17/2022    WBC 5.90 06/01/2022    RBC 3.57 (L) 06/17/2022    RBC 2.82 (L) 06/01/2022    HCT 31.5 (L) 06/17/2022    HCT 25.7 (L) 06/01/2022    MCV 88.2 06/17/2022    MCV 91.1 06/01/2022    MCH 27.7 06/17/2022    MCH 28.7 06/01/2022      Assessment & Plan   Order Review Tab  Health Maintenance Tab  Patient Plan/Order Tab  Diagnoses and all orders for this visit:    1. Other partial intestinal obstruction (HCC) (Primary)  -     Ambulatory Referral to Home Health  -     CBC Auto Differential  -     Comprehensive Metabolic Panel  -     Prealbumin    2. Hypokalemia  -     CBC Auto Differential  -     Comprehensive Metabolic Panel    3. Weight loss  Assessment & Plan:  - I advised the patient to use the walker and to not drive until physical therapy has worked with him.  - I will order blood tests today. Depending on the results, we may need to do another potassium pill or a calcium pill.  - I advised him to continue including protein in his diet.    Orders:  -     CBC Auto Differential  -     Comprehensive Metabolic Panel  -     Prealbumin  -     Vitamin B12 & Folate    4. Chronic pain syndrome  Assessment & Plan:  - I will refer the patient to physical therapy.  - I will prescribe tizanidine twice a day as needed.      5. Left foot drop  -     MRI Brain With & Without Contrast; Future    6. Weakness  -     MRI Brain With & Without Contrast; Future    Other orders  -     tiZANidine (Zanaflex) 4 MG tablet; Take 1 tablet by mouth 2 (Two) Times a Day As Needed for Muscle Spasms.  Dispense: 45 tablet; Refill: 1      Wrapup Tab  Return in about 4 weeks (around 7/15/2022), or if symptoms worsen or fail to improve.       They were informed of the diagnosis and treatment plan and directed to f/u for any further problems  or concerns.        Transcribed from ambient dictation for Shanel Bautista MD by Kym Olson.  06/17/22   16:33 EDT    Patient verbalized consent to the visit recording.

## 2022-06-18 ENCOUNTER — HOME HEALTH ADMISSION (OUTPATIENT)
Dept: HOME HEALTH SERVICES | Facility: HOME HEALTHCARE | Age: 72
End: 2022-06-18

## 2022-06-18 LAB
ALBUMIN SERPL-MCNC: 3 G/DL (ref 3.5–5.2)
ALBUMIN/GLOB SERPL: 0.7 G/DL
ALP SERPL-CCNC: 180 U/L (ref 39–117)
ALT SERPL W P-5'-P-CCNC: 36 U/L (ref 1–41)
ANION GAP SERPL CALCULATED.3IONS-SCNC: 13.4 MMOL/L (ref 5–15)
AST SERPL-CCNC: 41 U/L (ref 1–40)
BASOPHILS # BLD AUTO: 0.03 10*3/MM3 (ref 0–0.2)
BASOPHILS NFR BLD AUTO: 0.3 % (ref 0–1.5)
BILIRUB SERPL-MCNC: 0.2 MG/DL (ref 0–1.2)
BUN SERPL-MCNC: 13 MG/DL (ref 8–23)
BUN/CREAT SERPL: 23.6 (ref 7–25)
CALCIUM SPEC-SCNC: 9 MG/DL (ref 8.6–10.5)
CHLORIDE SERPL-SCNC: 91 MMOL/L (ref 98–107)
CO2 SERPL-SCNC: 26.6 MMOL/L (ref 22–29)
CREAT SERPL-MCNC: 0.55 MG/DL (ref 0.76–1.27)
DEPRECATED RDW RBC AUTO: 42.3 FL (ref 37–54)
EGFRCR SERPLBLD CKD-EPI 2021: 106 ML/MIN/1.73
EOSINOPHIL # BLD AUTO: 0.08 10*3/MM3 (ref 0–0.4)
EOSINOPHIL NFR BLD AUTO: 0.8 % (ref 0.3–6.2)
ERYTHROCYTE [DISTWIDTH] IN BLOOD BY AUTOMATED COUNT: 13.4 % (ref 12.3–15.4)
FOLATE SERPL-MCNC: 15.1 NG/ML (ref 4.78–24.2)
GLOBULIN UR ELPH-MCNC: 4.4 GM/DL
GLUCOSE SERPL-MCNC: 118 MG/DL (ref 65–99)
HCT VFR BLD AUTO: 31.5 % (ref 37.5–51)
HGB BLD-MCNC: 9.9 G/DL (ref 13–17.7)
IMM GRANULOCYTES # BLD AUTO: 0.04 10*3/MM3 (ref 0–0.05)
IMM GRANULOCYTES NFR BLD AUTO: 0.4 % (ref 0–0.5)
LYMPHOCYTES # BLD AUTO: 0.66 10*3/MM3 (ref 0.7–3.1)
LYMPHOCYTES NFR BLD AUTO: 6.9 % (ref 19.6–45.3)
MCH RBC QN AUTO: 27.7 PG (ref 26.6–33)
MCHC RBC AUTO-ENTMCNC: 31.4 G/DL (ref 31.5–35.7)
MCV RBC AUTO: 88.2 FL (ref 79–97)
MONOCYTES # BLD AUTO: 0.91 10*3/MM3 (ref 0.1–0.9)
MONOCYTES NFR BLD AUTO: 9.5 % (ref 5–12)
NEUTROPHILS NFR BLD AUTO: 7.84 10*3/MM3 (ref 1.7–7)
NEUTROPHILS NFR BLD AUTO: 82.1 % (ref 42.7–76)
NRBC BLD AUTO-RTO: 0 /100 WBC (ref 0–0.2)
PLATELET # BLD AUTO: 834 10*3/MM3 (ref 140–450)
PMV BLD AUTO: 8.3 FL (ref 6–12)
POTASSIUM SERPL-SCNC: 3.9 MMOL/L (ref 3.5–5.2)
PREALB SERPL-MCNC: 13.1 MG/DL (ref 20–40)
PROT SERPL-MCNC: 7.4 G/DL (ref 6–8.5)
RBC # BLD AUTO: 3.57 10*6/MM3 (ref 4.14–5.8)
SODIUM SERPL-SCNC: 131 MMOL/L (ref 136–145)
VIT B12 BLD-MCNC: 991 PG/ML (ref 211–946)
WBC NRBC COR # BLD: 9.56 10*3/MM3 (ref 3.4–10.8)

## 2022-06-21 ENCOUNTER — TELEPHONE (OUTPATIENT)
Dept: FAMILY MEDICINE CLINIC | Facility: CLINIC | Age: 72
End: 2022-06-21

## 2022-06-21 NOTE — TELEPHONE ENCOUNTER
Caller: JOLLY CABRALES    Relationship: Emergency Contact    Best call back number: [phone]   What is the best time to reach you: ANYTIME     Who are you requesting to speak with (clinical staff, provider,  specific staff member): CLINICAL    Do you know the name of the person who called: JOLLY    What was the call regarding: [facility] CONTACTED [facility]BAR[facility] , STATING THEY ARE UNDERSTAFFED. JOLLY IS WANTING TO KNOW IF DR. DRIVER WILL BE REFERRING HIM TO A DIFFERENT IN HOME PHYSICAL THERAPY     Do you require a callback: YES

## 2022-06-21 NOTE — TELEPHONE ENCOUNTER
Caller: JOLLY CABRALES    Relationship to patient: Emergency Contact    Best call back number: [phone]    Where are you experiencing symptoms: TROUBLE URINATING    How long have you been experiencing symptoms: 1 WEEK    Have you had any recent surgeries, procedures or injections: [x] Yes  [] No   If yes, explain:TOOK OUT PART OF COLON AND SMALL INTESTINES DUE TO BLOCKAGE      If a prescription is needed, what is your preferred pharmacy:   [address] [place] [address] [phone] [facility] [phone] [place] [id number]  Phone: [phone] Fax: [phone]

## 2022-06-24 ENCOUNTER — PATIENT MESSAGE (OUTPATIENT)
Dept: FAMILY MEDICINE CLINIC | Facility: CLINIC | Age: 72
End: 2022-06-24

## 2022-06-24 ENCOUNTER — TELEPHONE (OUTPATIENT)
Dept: FAMILY MEDICINE CLINIC | Facility: CLINIC | Age: 72
End: 2022-06-24

## 2022-06-24 NOTE — TELEPHONE ENCOUNTER
Caller: JOLLY CABRALES    Relationship: Emergency Contact    Best call back number: [phone]    What is the medical concern/diagnosis: ALBARO CAN NOT WALK WELL AFTER HIS KIDNEY SURGERY IN MAY. WIFE STATES HE IS HAVING A VERY HARD TIME GETTING HIS STRENGTH BACK UP AND [facility] CAN NOT HELP DUE TO STAFF SHORTAGE.     PATIENT WAS WAS SEEN IN OUR OFFICE ON 6/22     What specialty or service is being requested: PHYSICAL THERAPY    What is the provider, practice or medical service name:WHO EVER IS RECOMENDED

## 2022-06-24 NOTE — TELEPHONE ENCOUNTER
Caller: JLOLY CABRALES    Relationship to patient: Emergency Contact    Best call back number: [phone]    Patient is needing:     PATIENT'S WIFE SAID THAT HER  HAS STILL NOT RECEIVED ANY HOME HEALTH PHYSICAL THERAPY     SHE SAID ON Monday, 06/20 SHE SPOKE TO [facility] AND THEY TOLD HER THAT THEY WERE UNABLE TO TAKE THE PATIENT ON DUE TO STAFFING     A REFERRAL TO [facility] WAS SENT ON 06/21/22 BUT PATIENT HAS NOT HEARD FROM THEM YET

## 2022-06-26 ENCOUNTER — TELEPHONE (OUTPATIENT)
Dept: FAMILY MEDICINE CLINIC | Facility: CLINIC | Age: 72
End: 2022-06-26

## 2022-06-26 RX ORDER — HYDROCODONE BITARTRATE AND ACETAMINOPHEN 5; 325 MG/1; MG/1
1 TABLET ORAL EVERY 12 HOURS PRN
Qty: 45 TABLET | Refills: 0 | Status: SHIPPED | OUTPATIENT
Start: 2022-06-26 | End: 2022-06-27 | Stop reason: SDUPTHER

## 2022-06-26 NOTE — TELEPHONE ENCOUNTER
----- Message from Pramod Trent sent at 6/24/2022  9:55 AM EDT -----  Regarding: Prescription   Can I get my norco filled please. Cancellled the prescription from dr Nolasco.

## 2022-06-26 NOTE — TELEPHONE ENCOUNTER
From: Pramod Trent  To: Shanel Bautista MD  Sent: 6/24/2022 9:55 AM EDT  Subject: Prescription     Can I get my norco filled please. Cancellled the prescription from dr Nolasco.

## 2022-06-27 DIAGNOSIS — K56.690 OTHER PARTIAL INTESTINAL OBSTRUCTION: Primary | ICD-10-CM

## 2022-06-27 NOTE — TELEPHONE ENCOUNTER
Pharmacy Name: [id number] - [place], [place] - [address] AT [facility] - [phone] [facility] [phone] FX     What medication are you calling in regards to: HYDROcodone-acetaminophen (NORCO) 5-325 MG per tablet    What question does the pharmacy have: [facility] DOES NOT HAVE IN STOCK. REQUESTING TRANSFERRED TO [facility]

## 2022-06-28 RX ORDER — HYDROCODONE BITARTRATE AND ACETAMINOPHEN 5; 325 MG/1; MG/1
1 TABLET ORAL EVERY 12 HOURS PRN
Qty: 45 TABLET | Refills: 0 | Status: SHIPPED | OUTPATIENT
Start: 2022-06-28 | End: 2022-07-03

## 2022-07-01 ENCOUNTER — TELEPHONE (OUTPATIENT)
Dept: FAMILY MEDICINE CLINIC | Facility: CLINIC | Age: 72
End: 2022-07-01

## 2022-07-01 NOTE — TELEPHONE ENCOUNTER
Caller: JOLLY CABRALES    Relationship to patient: Emergency Contact    Best call back number: [phone]    Where are you experiencing symptoms: LOWER ABDOMINAL PAIN    How long have you been experiencing symptoms: 1 WEEK    Have you had any recent surgeries, procedures or injections: [x] Yes  [] No   If yes, explain: LOWER BLOCKAGE IN SMALL INTESTINE MAY 2022    If a prescription is needed, what is your preferred pharmacy:   [address] [facility] [phone] [facility] [phone] [place] [address]  Phone: [phone] Fax: [phone]

## 2022-07-01 NOTE — TELEPHONE ENCOUNTER
PATIENT IS SCHEDULED WITH SURGEON ON 7/5/22 AND IF THEY RECOMMEND HE SEE CMM SOONER THAN 7/15 THEY WILL CALL US.

## 2022-07-02 ENCOUNTER — HOSPITAL ENCOUNTER (OUTPATIENT)
Facility: HOSPITAL | Age: 72
Setting detail: OBSERVATION
Discharge: ANOTHER HOSPITAL | End: 2022-07-03
Attending: INTERNAL MEDICINE | Admitting: INTERNAL MEDICINE

## 2022-07-02 DIAGNOSIS — K52.9 COLITIS: Primary | ICD-10-CM

## 2022-07-02 DIAGNOSIS — N32.2 BLADDER FISTULA: ICD-10-CM

## 2022-07-02 LAB
ALBUMIN SERPL-MCNC: 2.6 G/DL (ref 3.5–5.2)
ALBUMIN/GLOB SERPL: 0.6 G/DL
ALP SERPL-CCNC: 95 U/L (ref 39–117)
ALT SERPL W P-5'-P-CCNC: 26 U/L (ref 1–41)
ANION GAP SERPL CALCULATED.3IONS-SCNC: 14 MMOL/L (ref 5–15)
AST SERPL-CCNC: 50 U/L (ref 1–40)
BASOPHILS # BLD AUTO: 0 10*3/MM3 (ref 0–0.2)
BASOPHILS NFR BLD AUTO: 0.2 % (ref 0–1.5)
BILIRUB SERPL-MCNC: 0.5 MG/DL (ref 0–1.2)
BILIRUB UR QL STRIP: NEGATIVE
BUN SERPL-MCNC: 12 MG/DL (ref 8–23)
BUN/CREAT SERPL: 27.9 (ref 7–25)
CALCIUM SPEC-SCNC: 8.7 MG/DL (ref 8.6–10.5)
CHLORIDE SERPL-SCNC: 95 MMOL/L (ref 98–107)
CLARITY UR: CLEAR
CO2 SERPL-SCNC: 22 MMOL/L (ref 22–29)
COLOR UR: YELLOW
CREAT SERPL-MCNC: 0.43 MG/DL (ref 0.76–1.27)
CRP SERPL-MCNC: 17.22 MG/DL (ref 0–0.5)
DEPRECATED RDW RBC AUTO: 48.1 FL (ref 37–54)
EGFRCR SERPLBLD CKD-EPI 2021: 114.1 ML/MIN/1.73
EOSINOPHIL # BLD AUTO: 0 10*3/MM3 (ref 0–0.4)
EOSINOPHIL NFR BLD AUTO: 0.2 % (ref 0.3–6.2)
ERYTHROCYTE [DISTWIDTH] IN BLOOD BY AUTOMATED COUNT: 17.4 % (ref 12.3–15.4)
ERYTHROCYTE [SEDIMENTATION RATE] IN BLOOD: 100 MM/HR (ref 0–20)
GLOBULIN UR ELPH-MCNC: 4.1 GM/DL
GLUCOSE SERPL-MCNC: 112 MG/DL (ref 65–99)
GLUCOSE UR STRIP-MCNC: NEGATIVE MG/DL
HCT VFR BLD AUTO: 28.3 % (ref 37.5–51)
HGB BLD-MCNC: 9.3 G/DL (ref 13–17.7)
HGB UR QL STRIP.AUTO: NEGATIVE
HOLD SPECIMEN: NORMAL
HOLD SPECIMEN: NORMAL
KETONES UR QL STRIP: NEGATIVE
LEUKOCYTE ESTERASE UR QL STRIP.AUTO: NEGATIVE
LYMPHOCYTES # BLD AUTO: 1.8 10*3/MM3 (ref 0.7–3.1)
LYMPHOCYTES NFR BLD AUTO: 16.9 % (ref 19.6–45.3)
MCH RBC QN AUTO: 26.2 PG (ref 26.6–33)
MCHC RBC AUTO-ENTMCNC: 32.9 G/DL (ref 31.5–35.7)
MCV RBC AUTO: 79.6 FL (ref 79–97)
MONOCYTES # BLD AUTO: 1.2 10*3/MM3 (ref 0.1–0.9)
MONOCYTES NFR BLD AUTO: 11.3 % (ref 5–12)
NEUTROPHILS NFR BLD AUTO: 7.5 10*3/MM3 (ref 1.7–7)
NEUTROPHILS NFR BLD AUTO: 71.4 % (ref 42.7–76)
NITRITE UR QL STRIP: NEGATIVE
NRBC BLD AUTO-RTO: 0 /100 WBC (ref 0–0.2)
PH UR STRIP.AUTO: 6.5 [PH] (ref 5–8)
PLATELET # BLD AUTO: 645 10*3/MM3 (ref 140–450)
PMV BLD AUTO: 6.1 FL (ref 6–12)
POTASSIUM SERPL-SCNC: 3.1 MMOL/L (ref 3.5–5.2)
PROT SERPL-MCNC: 6.7 G/DL (ref 6–8.5)
PROT UR QL STRIP: NEGATIVE
RBC # BLD AUTO: 3.56 10*6/MM3 (ref 4.14–5.8)
SODIUM SERPL-SCNC: 131 MMOL/L (ref 136–145)
SP GR UR STRIP: <=1.005 (ref 1–1.03)
UROBILINOGEN UR QL STRIP: NORMAL
WBC NRBC COR # BLD: 10.5 10*3/MM3 (ref 3.4–10.8)
WHOLE BLOOD HOLD COAG: NORMAL
WHOLE BLOOD HOLD SPECIMEN: NORMAL

## 2022-07-02 PROCEDURE — 80307 DRUG TEST PRSMV CHEM ANLYZR: CPT | Performed by: NURSE PRACTITIONER

## 2022-07-02 PROCEDURE — 81003 URINALYSIS AUTO W/O SCOPE: CPT | Performed by: NURSE PRACTITIONER

## 2022-07-02 PROCEDURE — 85652 RBC SED RATE AUTOMATED: CPT | Performed by: NURSE PRACTITIONER

## 2022-07-02 PROCEDURE — 80053 COMPREHEN METABOLIC PANEL: CPT | Performed by: NURSE PRACTITIONER

## 2022-07-02 PROCEDURE — 85025 COMPLETE CBC W/AUTO DIFF WBC: CPT | Performed by: NURSE PRACTITIONER

## 2022-07-02 PROCEDURE — 25010000002 ONDANSETRON PER 1 MG: Performed by: NURSE PRACTITIONER

## 2022-07-02 PROCEDURE — 83735 ASSAY OF MAGNESIUM: CPT | Performed by: NURSE PRACTITIONER

## 2022-07-02 PROCEDURE — 51798 US URINE CAPACITY MEASURE: CPT

## 2022-07-02 PROCEDURE — 86140 C-REACTIVE PROTEIN: CPT | Performed by: NURSE PRACTITIONER

## 2022-07-02 PROCEDURE — 25010000002 HYDROMORPHONE 1 MG/ML SOLUTION: Performed by: NURSE PRACTITIONER

## 2022-07-02 PROCEDURE — 84100 ASSAY OF PHOSPHORUS: CPT | Performed by: NURSE PRACTITIONER

## 2022-07-02 PROCEDURE — 82077 ASSAY SPEC XCP UR&BREATH IA: CPT | Performed by: NURSE PRACTITIONER

## 2022-07-02 PROCEDURE — 99284 EMERGENCY DEPT VISIT MOD MDM: CPT

## 2022-07-02 PROCEDURE — 96375 TX/PRO/DX INJ NEW DRUG ADDON: CPT

## 2022-07-02 PROCEDURE — 85610 PROTHROMBIN TIME: CPT | Performed by: NURSE PRACTITIONER

## 2022-07-02 PROCEDURE — 96374 THER/PROPH/DIAG INJ IV PUSH: CPT

## 2022-07-02 PROCEDURE — 87040 BLOOD CULTURE FOR BACTERIA: CPT | Performed by: NURSE PRACTITIONER

## 2022-07-02 RX ORDER — ONDANSETRON 2 MG/ML
4 INJECTION INTRAMUSCULAR; INTRAVENOUS ONCE
Status: COMPLETED | OUTPATIENT
Start: 2022-07-02 | End: 2022-07-02

## 2022-07-02 RX ORDER — SODIUM CHLORIDE 0.9 % (FLUSH) 0.9 %
10 SYRINGE (ML) INJECTION AS NEEDED
Status: DISCONTINUED | OUTPATIENT
Start: 2022-07-02 | End: 2022-07-03 | Stop reason: HOSPADM

## 2022-07-02 RX ADMIN — HYDROMORPHONE HYDROCHLORIDE 0.5 MG: 1 INJECTION, SOLUTION INTRAMUSCULAR; INTRAVENOUS; SUBCUTANEOUS at 22:45

## 2022-07-02 RX ADMIN — ONDANSETRON 4 MG: 2 INJECTION INTRAMUSCULAR; INTRAVENOUS at 22:45

## 2022-07-03 ENCOUNTER — APPOINTMENT (OUTPATIENT)
Dept: CT IMAGING | Facility: HOSPITAL | Age: 72
End: 2022-07-03

## 2022-07-03 VITALS
OXYGEN SATURATION: 99 % | HEIGHT: 72 IN | HEART RATE: 90 BPM | DIASTOLIC BLOOD PRESSURE: 70 MMHG | TEMPERATURE: 98 F | WEIGHT: 143.74 LBS | SYSTOLIC BLOOD PRESSURE: 103 MMHG | BODY MASS INDEX: 19.47 KG/M2 | RESPIRATION RATE: 19 BRPM

## 2022-07-03 PROBLEM — K52.9 ENTEROCOLITIS: Status: ACTIVE | Noted: 2022-05-31

## 2022-07-03 PROBLEM — K56.609 SMALL BOWEL OBSTRUCTION: Status: ACTIVE | Noted: 2022-05-08

## 2022-07-03 PROBLEM — N17.9 AKI (ACUTE KIDNEY INJURY): Status: ACTIVE | Noted: 2022-05-07

## 2022-07-03 PROBLEM — R19.7 ACUTE DIARRHEA: Status: ACTIVE | Noted: 2022-05-08

## 2022-07-03 PROBLEM — Z87.19 HISTORY OF ILEUS: Status: ACTIVE | Noted: 2022-05-31

## 2022-07-03 PROBLEM — D64.9 NORMOCYTIC ANEMIA: Status: ACTIVE | Noted: 2022-05-31

## 2022-07-03 PROBLEM — Z90.49 S/P COLON RESECTION: Chronic | Status: ACTIVE | Noted: 2022-07-03

## 2022-07-03 PROBLEM — R63.4 UNINTENTIONAL WEIGHT LOSS: Status: ACTIVE | Noted: 2022-05-31

## 2022-07-03 PROBLEM — F10.11 HISTORY OF ALCOHOL ABUSE: Status: ACTIVE | Noted: 2022-07-03

## 2022-07-03 PROBLEM — K52.9 COLITIS: Status: ACTIVE | Noted: 2022-07-03

## 2022-07-03 LAB
AMMONIA BLD-SCNC: 27 UMOL/L (ref 16–60)
AMPHET+METHAMPHET UR QL: NEGATIVE
ANION GAP SERPL CALCULATED.3IONS-SCNC: 12 MMOL/L (ref 5–15)
ANION GAP SERPL CALCULATED.3IONS-SCNC: 13 MMOL/L (ref 5–15)
APTT PPP: 30.5 SECONDS (ref 24–31)
BARBITURATES UR QL SCN: NEGATIVE
BASOPHILS # BLD AUTO: 0 10*3/MM3 (ref 0–0.2)
BASOPHILS NFR BLD AUTO: 0.2 % (ref 0–1.5)
BENZODIAZ UR QL SCN: NEGATIVE
BUN SERPL-MCNC: 12 MG/DL (ref 8–23)
BUN SERPL-MCNC: 12 MG/DL (ref 8–23)
BUN/CREAT SERPL: 32.4 (ref 7–25)
BUN/CREAT SERPL: 36.4 (ref 7–25)
CA-I SERPL ISE-MCNC: 1.21 MMOL/L (ref 1.2–1.3)
CALCIUM SPEC-SCNC: 8.1 MG/DL (ref 8.6–10.5)
CALCIUM SPEC-SCNC: 8.3 MG/DL (ref 8.6–10.5)
CANNABINOIDS SERPL QL: NEGATIVE
CHLORIDE SERPL-SCNC: 100 MMOL/L (ref 98–107)
CHLORIDE SERPL-SCNC: 97 MMOL/L (ref 98–107)
CO2 SERPL-SCNC: 21 MMOL/L (ref 22–29)
CO2 SERPL-SCNC: 23 MMOL/L (ref 22–29)
COCAINE UR QL: NEGATIVE
CREAT SERPL-MCNC: 0.33 MG/DL (ref 0.76–1.27)
CREAT SERPL-MCNC: 0.37 MG/DL (ref 0.76–1.27)
D-LACTATE SERPL-SCNC: 0.8 MMOL/L (ref 0.5–2)
D-LACTATE SERPL-SCNC: 1.1 MMOL/L (ref 0.5–2)
D-LACTATE SERPL-SCNC: 1.2 MMOL/L (ref 0.5–2)
DEPRECATED RDW RBC AUTO: 47.7 FL (ref 37–54)
EGFRCR SERPLBLD CKD-EPI 2021: 119.4 ML/MIN/1.73
EGFRCR SERPLBLD CKD-EPI 2021: 123.6 ML/MIN/1.73
EOSINOPHIL # BLD AUTO: 0 10*3/MM3 (ref 0–0.4)
EOSINOPHIL NFR BLD AUTO: 0.3 % (ref 0.3–6.2)
ERYTHROCYTE [DISTWIDTH] IN BLOOD BY AUTOMATED COUNT: 17 % (ref 12.3–15.4)
ETHANOL UR QL: <0.01 %
GLUCOSE SERPL-MCNC: 102 MG/DL (ref 65–99)
GLUCOSE SERPL-MCNC: 87 MG/DL (ref 65–99)
HCT VFR BLD AUTO: 28.8 % (ref 37.5–51)
HGB BLD-MCNC: 9.5 G/DL (ref 13–17.7)
INR PPP: 1.33 (ref 0.93–1.1)
LYMPHOCYTES # BLD AUTO: 1.4 10*3/MM3 (ref 0.7–3.1)
LYMPHOCYTES NFR BLD AUTO: 15.6 % (ref 19.6–45.3)
MAGNESIUM SERPL-MCNC: 1.8 MG/DL (ref 1.6–2.4)
MCH RBC QN AUTO: 26.4 PG (ref 26.6–33)
MCHC RBC AUTO-ENTMCNC: 32.8 G/DL (ref 31.5–35.7)
MCV RBC AUTO: 80.5 FL (ref 79–97)
METHADONE UR QL SCN: NEGATIVE
MONOCYTES # BLD AUTO: 0.9 10*3/MM3 (ref 0.1–0.9)
MONOCYTES NFR BLD AUTO: 10.3 % (ref 5–12)
NEUTROPHILS NFR BLD AUTO: 6.6 10*3/MM3 (ref 1.7–7)
NEUTROPHILS NFR BLD AUTO: 73.6 % (ref 42.7–76)
NRBC BLD AUTO-RTO: 0 /100 WBC (ref 0–0.2)
OPIATES UR QL: NEGATIVE
OXYCODONE UR QL SCN: NEGATIVE
PHOSPHATE SERPL-MCNC: 3.4 MG/DL (ref 2.5–4.5)
PLATELET # BLD AUTO: 601 10*3/MM3 (ref 140–450)
PMV BLD AUTO: 5.6 FL (ref 6–12)
POTASSIUM SERPL-SCNC: 3.1 MMOL/L (ref 3.5–5.2)
POTASSIUM SERPL-SCNC: 3.5 MMOL/L (ref 3.5–5.2)
PROTHROMBIN TIME: 13.5 SECONDS (ref 9.6–11.7)
RBC # BLD AUTO: 3.58 10*6/MM3 (ref 4.14–5.8)
SARS-COV-2 RNA PNL SPEC NAA+PROBE: NOT DETECTED
SODIUM SERPL-SCNC: 133 MMOL/L (ref 136–145)
SODIUM SERPL-SCNC: 133 MMOL/L (ref 136–145)
WBC NRBC COR # BLD: 9 10*3/MM3 (ref 3.4–10.8)

## 2022-07-03 PROCEDURE — 25010000002 SODIUM CHLORIDE 0.9 % WITH KCL 20 MEQ 20-0.9 MEQ/L-% SOLUTION: Performed by: NURSE PRACTITIONER

## 2022-07-03 PROCEDURE — G0378 HOSPITAL OBSERVATION PER HR: HCPCS

## 2022-07-03 PROCEDURE — 96376 TX/PRO/DX INJ SAME DRUG ADON: CPT

## 2022-07-03 PROCEDURE — 87635 SARS-COV-2 COVID-19 AMP PRB: CPT | Performed by: INTERNAL MEDICINE

## 2022-07-03 PROCEDURE — 99236 HOSP IP/OBS SAME DATE HI 85: CPT | Performed by: INTERNAL MEDICINE

## 2022-07-03 PROCEDURE — 85025 COMPLETE CBC W/AUTO DIFF WBC: CPT | Performed by: NURSE PRACTITIONER

## 2022-07-03 PROCEDURE — 25010000002 HYDROMORPHONE 1 MG/ML SOLUTION: Performed by: INTERNAL MEDICINE

## 2022-07-03 PROCEDURE — 25010000002 THIAMINE PER 100 MG: Performed by: NURSE PRACTITIONER

## 2022-07-03 PROCEDURE — 25010000002 ONDANSETRON PER 1 MG: Performed by: NURSE PRACTITIONER

## 2022-07-03 PROCEDURE — 82140 ASSAY OF AMMONIA: CPT | Performed by: NURSE PRACTITIONER

## 2022-07-03 PROCEDURE — 99204 OFFICE O/P NEW MOD 45 MIN: CPT | Performed by: SURGERY

## 2022-07-03 PROCEDURE — 25010000002 HYDROMORPHONE 1 MG/ML SOLUTION: Performed by: NURSE PRACTITIONER

## 2022-07-03 PROCEDURE — 85730 THROMBOPLASTIN TIME PARTIAL: CPT | Performed by: NURSE PRACTITIONER

## 2022-07-03 PROCEDURE — 0 POTASSIUM CHLORIDE 10 MEQ/100ML SOLUTION: Performed by: NURSE PRACTITIONER

## 2022-07-03 PROCEDURE — 80048 BASIC METABOLIC PNL TOTAL CA: CPT | Performed by: NURSE PRACTITIONER

## 2022-07-03 PROCEDURE — 36415 COLL VENOUS BLD VENIPUNCTURE: CPT | Performed by: NURSE PRACTITIONER

## 2022-07-03 PROCEDURE — 83605 ASSAY OF LACTIC ACID: CPT | Performed by: NURSE PRACTITIONER

## 2022-07-03 PROCEDURE — C9803 HOPD COVID-19 SPEC COLLECT: HCPCS

## 2022-07-03 PROCEDURE — 82330 ASSAY OF CALCIUM: CPT | Performed by: NURSE PRACTITIONER

## 2022-07-03 PROCEDURE — 74177 CT ABD & PELVIS W/CONTRAST: CPT

## 2022-07-03 PROCEDURE — 0 IOPAMIDOL PER 1 ML: Performed by: NURSE PRACTITIONER

## 2022-07-03 RX ORDER — ACETAMINOPHEN 325 MG/1
650 TABLET ORAL EVERY 4 HOURS PRN
Status: DISCONTINUED | OUTPATIENT
Start: 2022-07-03 | End: 2022-07-03 | Stop reason: HOSPADM

## 2022-07-03 RX ORDER — SODIUM CHLORIDE 9 MG/ML
75 INJECTION, SOLUTION INTRAVENOUS CONTINUOUS
Status: DISCONTINUED | OUTPATIENT
Start: 2022-07-03 | End: 2022-07-03

## 2022-07-03 RX ORDER — POTASSIUM PHOS IN 0.9 % NACL 30MMOL/250
30 PLASTIC BAG, INJECTION (ML) INTRAVENOUS AS NEEDED
Status: DISCONTINUED | OUTPATIENT
Start: 2022-07-03 | End: 2022-07-03 | Stop reason: HOSPADM

## 2022-07-03 RX ORDER — POTASSIUM CHLORIDE 7.45 MG/ML
10 INJECTION INTRAVENOUS
Status: DISCONTINUED | OUTPATIENT
Start: 2022-07-03 | End: 2022-07-03 | Stop reason: HOSPADM

## 2022-07-03 RX ORDER — SODIUM CHLORIDE 0.9 % (FLUSH) 0.9 %
10 SYRINGE (ML) INJECTION AS NEEDED
Status: DISCONTINUED | OUTPATIENT
Start: 2022-07-03 | End: 2022-07-03 | Stop reason: HOSPADM

## 2022-07-03 RX ORDER — METRONIDAZOLE 500 MG/100ML
500 INJECTION, SOLUTION INTRAVENOUS EVERY 8 HOURS
Status: DISCONTINUED | OUTPATIENT
Start: 2022-07-03 | End: 2022-07-03 | Stop reason: HOSPADM

## 2022-07-03 RX ORDER — CALCIUM GLUCONATE 20 MG/ML
2 INJECTION, SOLUTION INTRAVENOUS AS NEEDED
Status: DISCONTINUED | OUTPATIENT
Start: 2022-07-03 | End: 2022-07-03 | Stop reason: HOSPADM

## 2022-07-03 RX ORDER — ACETAMINOPHEN 160 MG/5ML
650 SOLUTION ORAL EVERY 4 HOURS PRN
Status: DISCONTINUED | OUTPATIENT
Start: 2022-07-03 | End: 2022-07-03 | Stop reason: HOSPADM

## 2022-07-03 RX ORDER — ONDANSETRON 2 MG/ML
4 INJECTION INTRAMUSCULAR; INTRAVENOUS ONCE
Status: COMPLETED | OUTPATIENT
Start: 2022-07-03 | End: 2022-07-03

## 2022-07-03 RX ORDER — MAGNESIUM SULFATE HEPTAHYDRATE 40 MG/ML
4 INJECTION, SOLUTION INTRAVENOUS AS NEEDED
Status: DISCONTINUED | OUTPATIENT
Start: 2022-07-03 | End: 2022-07-03 | Stop reason: HOSPADM

## 2022-07-03 RX ORDER — CALCIUM GLUCONATE 20 MG/ML
1 INJECTION, SOLUTION INTRAVENOUS AS NEEDED
Status: DISCONTINUED | OUTPATIENT
Start: 2022-07-03 | End: 2022-07-03 | Stop reason: HOSPADM

## 2022-07-03 RX ORDER — DIPHENOXYLATE HYDROCHLORIDE AND ATROPINE SULFATE 2.5; .025 MG/1; MG/1
1 TABLET ORAL 4 TIMES DAILY PRN
Status: DISCONTINUED | OUTPATIENT
Start: 2022-07-03 | End: 2022-07-03 | Stop reason: HOSPADM

## 2022-07-03 RX ORDER — SODIUM PHOSPHATE IN 0.9 % NACL 15MMOL/100
15 PLASTIC BAG, INJECTION (ML) INTRAVENOUS AS NEEDED
Status: DISCONTINUED | OUTPATIENT
Start: 2022-07-03 | End: 2022-07-03 | Stop reason: HOSPADM

## 2022-07-03 RX ORDER — NITROGLYCERIN 0.4 MG/1
0.4 TABLET SUBLINGUAL
Status: DISCONTINUED | OUTPATIENT
Start: 2022-07-03 | End: 2022-07-03 | Stop reason: HOSPADM

## 2022-07-03 RX ORDER — MAGNESIUM SULFATE HEPTAHYDRATE 40 MG/ML
2 INJECTION, SOLUTION INTRAVENOUS AS NEEDED
Status: DISCONTINUED | OUTPATIENT
Start: 2022-07-03 | End: 2022-07-03 | Stop reason: HOSPADM

## 2022-07-03 RX ORDER — ACETAMINOPHEN 650 MG/1
650 SUPPOSITORY RECTAL EVERY 4 HOURS PRN
Status: DISCONTINUED | OUTPATIENT
Start: 2022-07-03 | End: 2022-07-03 | Stop reason: HOSPADM

## 2022-07-03 RX ORDER — SODIUM CHLORIDE 0.9 % (FLUSH) 0.9 %
10 SYRINGE (ML) INJECTION EVERY 12 HOURS SCHEDULED
Status: DISCONTINUED | OUTPATIENT
Start: 2022-07-03 | End: 2022-07-03 | Stop reason: HOSPADM

## 2022-07-03 RX ORDER — SODIUM CHLORIDE AND POTASSIUM CHLORIDE 150; 900 MG/100ML; MG/100ML
100 INJECTION, SOLUTION INTRAVENOUS CONTINUOUS
Status: DISCONTINUED | OUTPATIENT
Start: 2022-07-03 | End: 2022-07-03 | Stop reason: HOSPADM

## 2022-07-03 RX ORDER — FENTANYL/ROPIVACAINE/NS/PF 2-625MCG/1
15 PLASTIC BAG, INJECTION (ML) EPIDURAL AS NEEDED
Status: DISCONTINUED | OUTPATIENT
Start: 2022-07-03 | End: 2022-07-03 | Stop reason: HOSPADM

## 2022-07-03 RX ADMIN — HYDROMORPHONE HYDROCHLORIDE 0.5 MG: 1 INJECTION, SOLUTION INTRAMUSCULAR; INTRAVENOUS; SUBCUTANEOUS at 02:33

## 2022-07-03 RX ADMIN — AZTREONAM 2 G: 2 INJECTION, POWDER, LYOPHILIZED, FOR SOLUTION INTRAMUSCULAR; INTRAVENOUS at 06:04

## 2022-07-03 RX ADMIN — DIPHENOXYLATE HYDROCHLORIDE AND ATROPINE SULFATE 1 TABLET: 2.5; .025 TABLET ORAL at 14:19

## 2022-07-03 RX ADMIN — SODIUM CHLORIDE AND POTASSIUM CHLORIDE 100 ML/HR: .9; .15 SOLUTION INTRAVENOUS at 06:02

## 2022-07-03 RX ADMIN — Medication 10 ML: at 09:17

## 2022-07-03 RX ADMIN — SODIUM CHLORIDE AND POTASSIUM CHLORIDE 100 ML/HR: .9; .15 SOLUTION INTRAVENOUS at 06:04

## 2022-07-03 RX ADMIN — IOPAMIDOL 100 ML: 755 INJECTION, SOLUTION INTRAVENOUS at 01:10

## 2022-07-03 RX ADMIN — METRONIDAZOLE 500 MG: 500 INJECTION, SOLUTION INTRAVENOUS at 07:14

## 2022-07-03 RX ADMIN — POTASSIUM CHLORIDE 10 MEQ: 7.46 INJECTION, SOLUTION INTRAVENOUS at 09:18

## 2022-07-03 RX ADMIN — HYDROMORPHONE HYDROCHLORIDE 0.2 MG: 1 INJECTION, SOLUTION INTRAMUSCULAR; INTRAVENOUS; SUBCUTANEOUS at 09:17

## 2022-07-03 RX ADMIN — FOLIC ACID 100 ML/HR: 5 INJECTION, SOLUTION INTRAMUSCULAR; INTRAVENOUS; SUBCUTANEOUS at 03:37

## 2022-07-03 RX ADMIN — POTASSIUM CHLORIDE 10 MEQ: 7.46 INJECTION, SOLUTION INTRAVENOUS at 11:48

## 2022-07-03 RX ADMIN — METRONIDAZOLE 500 MG: 500 INJECTION, SOLUTION INTRAVENOUS at 11:48

## 2022-07-03 RX ADMIN — AZTREONAM 2 G: 2 INJECTION, POWDER, LYOPHILIZED, FOR SOLUTION INTRAMUSCULAR; INTRAVENOUS at 11:47

## 2022-07-03 RX ADMIN — ONDANSETRON 4 MG: 2 INJECTION INTRAMUSCULAR; INTRAVENOUS at 02:34

## 2022-07-03 RX ADMIN — Medication 10 ML: at 06:05

## 2022-07-03 NOTE — NURSING NOTE
Pt transferred from ER to Rm 231; spouse at bedside. SHILPI Murray to perform admission assessment; this nurse assuming primary care of patient for remainder of this shift.

## 2022-07-03 NOTE — ED PROVIDER NOTES
Subjective   73 y/o  male presents to ER with c/o of lower abd pain and pressure since May 4, 2022.  Patient states that he's had a bowel obstruction the beginning of May and was seen at [facility] and had surgery performed by Dr. May at [facility].  Patient states that he has not felt right and has had difficulty urinating and severe diarrhea since.  Patient rates his pain as a 7 out of 10.  Patient states that he has had a fever off and on for the last 5 to 7 days but today the diarrhea worsened and he took an Imodium and stated that the diarrhea went away but has felt poorly and has been unable to eat or drink anything for the past week so decided to come to the ER for further evaluation and care.  Onset: Patient states that pain all originated beginning of May when he had a bowel obstruction and subsequent surgery thereafter  Location: Lower pelvic and bladder area  Duration: Close to 2 months  Character: Dysuria and severe pelvic pressure  Aggravating/Alleviating Factors: Urination and diarrhea/none  Radiation: Lower abdomen  Severity: Severe            Review of Systems   Constitutional: Positive for activity change, appetite change, chills, diaphoresis, fatigue, fever and unexpected weight change.   HENT: Negative.    Eyes: Negative.    Respiratory: Negative for cough and shortness of breath.    Cardiovascular: Negative for chest pain and palpitations.   Gastrointestinal: Positive for abdominal pain, diarrhea and nausea.   Endocrine: Negative.    Genitourinary: Positive for decreased urine volume, difficulty urinating, dysuria, enuresis and urgency. Negative for hematuria, penile discharge, penile pain, penile swelling, scrotal swelling and testicular pain.   Musculoskeletal: Positive for gait problem. Negative for back pain.   Skin: Negative for rash and wound.   Allergic/Immunologic: Negative.    Neurological: Positive for weakness. Negative for dizziness, syncope, light-headedness and headaches.    Hematological: Negative.    Psychiatric/Behavioral: Negative.        Past Medical History:   Diagnosis Date   • Anxiety 10/24/2019   • Chronic pain syndrome 10/24/2019   • DDD (degenerative disc disease), cervical 10/24/2019   • Erectile dysfunction due to diseases classified elsewhere 10/24/2019   • Post-traumatic osteoarthritis of one knee, right 2018    Overview:  Added automatically from request for surgery [id number]       Allergies   Allergen Reactions   • Morphine Hives     RASH,FEVER   • Penicillins Rash     RASH,FEVER       Past Surgical History:   Procedure Laterality Date   • REPLACEMENT TOTAL KNEE Right    • TONSILLECTOMY         Family History   Problem Relation Age of Onset   • Hypertension Mother        Social History     Socioeconomic History   • Marital status:    Tobacco Use   • Smoking status: Former Smoker     Packs/day: 1.00     Years: 30.00     Pack years: 30.00     Types: Cigarettes, Cigarettes     Quit date: 2002     Years since quittin.5   • Smokeless tobacco: Former User     Types: Chew   Vaping Use   • Vaping Use: Never used   Substance and Sexual Activity   • Alcohol use: Yes   • Drug use: Never   • Sexual activity: Defer           Objective   Physical Exam  Vitals reviewed.   Constitutional:       General: He is in acute distress.      Appearance: He is ill-appearing.   HENT:      Head: Normocephalic and atraumatic.      Mouth/Throat:      Mouth: Mucous membranes are pale and dry.   Eyes:      Extraocular Movements: Extraocular movements intact.      Pupils: Pupils are equal, round, and reactive to light.   Cardiovascular:      Rate and Rhythm: Tachycardia present.   Pulmonary:      Effort: Pulmonary effort is normal.   Abdominal:      General: Bowel sounds are normal.      Tenderness: There is abdominal tenderness in the suprapubic area. There is guarding.   Genitourinary:      Skin:     General: Skin is cool.      Capillary Refill: Capillary refill takes 2 to 3  seconds.      Coloration: Skin is pale.   Neurological:      Mental Status: He is alert.         Procedures           ED Course    PIV obtained.  Treated pain with small dose of dilaudid and zofran regimen.  CBC, CMP, CRP, sed rate andand lipase obtained.  White blood cell count is within normal limits but CRP and sed rate are extremely elevated.  Urinalysis was obtained and is surprisingly negative for any acute UTI or any other abnormality.  Blood cultures x2 pending at time of admission.    Vitals:    07/03/22 0201   BP: 115/69   Pulse: 91   Resp:    Temp:    SpO2: 96%       ED Course as of 07/03/22 0228   Sun Jul 03, 2022   0153 Dr Chaudhari agrees to see patient tomorrow am, recommend start on abx regimen and NPO. [CT]   0200 After discussion with Dr. Chaudhari, called hospitalist and Concepcion returned my phone call and agreed to admit to hospital service.  Concepcion states she will order antibiotics and admission orders. [CT]      ED Course User Index  [CT] Arianna Kellogg, ANTONINA      CT Abdomen Pelvis With Contrast    Result Date: 7/3/2022  Impression: 1. Segmental marked distal colonic wall and mucosal fold thickening and hyperenhancement suspicious for infectious or inflammatory colitis. 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 Fistulous involvement of the posterior urinary bladder is possible. Underlying, infiltrative colonic neoplasm such as lymphoma in this location is an alternate consideration. 2. Bilateral mild hydronephrosis, likely resulting from distal ureteral ileus. Electronically signed by:  Juancho Bedoya M.D.  7/2/2022 11:29 PM    Labs Reviewed   COMPREHENSIVE METABOLIC PANEL - Abnormal; Notable for the following components:       Result Value    Glucose 112 (*)     Creatinine 0.43 (*)     Sodium 131 (*)     Potassium 3.1 (*)     Chloride 95 (*)     Albumin 2.60 (*)     AST (SGOT) 50 (*)     BUN/Creatinine Ratio 27.9 (*)     All other  components within normal limits    Narrative:     GFR Normal >60  Chronic Kidney Disease <60  Kidney Failure <15     SEDIMENTATION RATE - Abnormal; Notable for the following components:    Sed Rate 100 (*)     All other components within normal limits   C-REACTIVE PROTEIN - Abnormal; Notable for the following components:    C-Reactive Protein 17.22 (*)     All other components within normal limits   CBC WITH AUTO DIFFERENTIAL - Abnormal; Notable for the following components:    RBC 3.56 (*)     Hemoglobin 9.3 (*)     Hematocrit 28.3 (*)     MCH 26.2 (*)     RDW 17.4 (*)     Platelets 645 (*)     Lymphocyte % 16.9 (*)     Eosinophil % 0.2 (*)     Neutrophils, Absolute 7.50 (*)     Monocytes, Absolute 1.20 (*)     All other components within normal limits   URINALYSIS W/ CULTURE IF INDICATED - Normal    Narrative:     In absence of clinical symptoms, the presence of pyuria, bacteria, and/or nitrites on the urinalysis result does not correlate with infection.  Urine microscopic not indicated.   BLOOD CULTURE   BLOOD CULTURE   COVID PRE-OP / PRE-PROCEDURE SCREENING ORDER (NO ISOLATION)    Narrative:     The following orders were created for panel order COVID PRE-OP / PRE-PROCEDURE SCREENING ORDER (NO ISOLATION) - Swab, Nasopharynx.  Procedure                               Abnormality         Status                     ---------                               -----------         ------                     COVID-19,CEPHEID/OSWALDO,CO...[[id number]]                                                   Please view results for these tests on the individual orders.   COVID-19,CEPHEID/OSWALDO,COR/ИРИНА/PAD/CECILIA IN-HOUSE,NP SWAB IN TRANSPORT MEDIA 3-4 HR TAT, RT-PCR   RAINBOW DRAW    Narrative:     The following orders were created for panel order [place] Draw.  Procedure                               Abnormality         Status                     ---------                               -----------         ------                     Green  Top (Gel)[[id number]]                                  Final result               Lavender Top[[id number]]                                     Final result               Gold Top - SST[[id number]]                                   Final result               Light Blue Top[[id number]]                                   Final result                 Please view results for these tests on the individual orders.   POC LACTATE   GREEN TOP   LAVENDER TOP   GOLD TOP - SST   LIGHT BLUE TOP   CBC AND DIFFERENTIAL    Narrative:     The following orders were created for panel order CBC & Differential.  Procedure                               Abnormality         Status                     ---------                               -----------         ------                     CBC Auto Differential[[id number]]        Abnormal            Final result                 Please view results for these tests on the individual orders.     Medications   sodium chloride 0.9 % flush 10 mL (has no administration in time range)   HYDROmorphone (DILAUDID) injection 0.5 mg (has no administration in time range)   ondansetron (ZOFRAN) injection 4 mg (has no administration in time range)   sodium chloride 0.9 % infusion (has no administration in time range)   HYDROmorphone (DILAUDID) injection 0.5 mg (0.5 mg Intravenous Given 7/2/22 2245)   ondansetron (ZOFRAN) injection 4 mg (4 mg Intravenous Given 7/2/22 2245)   iopamidol (ISOVUE-370) 76 % injection 100 mL (100 mL Intravenous Given 7/3/22 0110)        Results of work-up discussed with the patient and family and both verbalized understanding of colitis and probable fistulous communicating with bladder and colon.  Notified patient and family that we will keep n.p.o. status in preparation for possible surgery for the a.m.                                MDM    Final diagnoses:   Colitis   Bladder fistula       ED Disposition  ED Disposition     ED Disposition   Decision to Admit    Condition   --    Comment    Level of Care: Med/Surg [1]   Admitting Physician: CRISTY DAVENPORT [[id number]]   Attending Physician: CRISTY DAVENPORT [[id number]]               No follow-up provider specified.       Medication List      ASK your doctor about these medications    Acidophilus/Pectin capsule  Ask about: Should I take this medication?             Arianna Kellogg, APRN  07/03/22 0229

## 2022-07-03 NOTE — PLAN OF CARE
Goal Outcome Evaluation:  Plan of Care Reviewed With: patient        Progress: no change  Outcome Evaluation: pt is currently resting in bed. pt has had lashanda complaints of pain during the shift so far. medication was given, see MAR. pt is being transfered to [facility] in [place] to continue treatment. pt has been encouraged and assisted to turn to help prevent skin breakdown.

## 2022-07-03 NOTE — CONSULTS
GENERAL SURGERY CONSULT    Referring Provider: Arianna Kellogg  Reason for Consultation: abdominal pain    Patient Care Team:  Shanel Bautista MD as PCP - General (Internal Medicine)    Chief complaint abdominal pain, diarrhea, urinary issues    Subjective .     History of present illness: 72-year-old gentleman who presented to the emergency department last night with complaints of urinary frequency and pressure, lower abdominal pain, diarrhea.  He underwent an ileocolic resection for ileitis and obstruction at [facility] in May of this year with Dr. May.  Per he and his wife he has follow-up scheduled with Dr. May this Wednesday due to his ongoing issues.  His pathology at that time showed an incidental cystadenoma of the appendix, possible early appendicitis, inflammation of the small intestine.  He states that he has had continued lower abdominal pain and diarrhea essentially since that time.  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  The patient notes that he has not had a colonoscopy.  He denies any passage of air with his urinary stream.  Other than continued complaints of lower abdominal pain he is feeling fairly well currently.  He is requesting Imodium for diarrhea.    Review of Systems    Review of Systems   Gastrointestinal: Positive for abdominal pain and diarrhea.   Genitourinary: Positive for difficulty urinating, dysuria and frequency.         History  Past Medical History:   Diagnosis Date   • Anxiety 10/24/2019   • Chronic pain syndrome 10/24/2019   • DDD (degenerative disc disease), cervical 10/24/2019   • Erectile dysfunction due to diseases classified elsewhere 10/24/2019   • Foot drop, left foot 07/03/2022   • Post-traumatic osteoarthritis of one knee, right 09/05/2018    Overview:  Added  automatically from request for surgery [id number]   ,   Past Surgical History:   Procedure Laterality Date   • REPLACEMENT TOTAL KNEE Right    • TONSILLECTOMY     ,   Family History   Problem Relation Age of Onset   • Hypertension Mother    ,   Social History     Tobacco Use   • Smoking status: Former Smoker     Packs/day: 1.00     Years: 30.00     Pack years: 30.00     Types: Cigarettes, Cigarettes     Quit date: 2002     Years since quittin.5   • Smokeless tobacco: Former User     Types: Chew   Vaping Use   • Vaping Use: Never used   Substance Use Topics   • Alcohol use: Not Currently   • Drug use: Never   ,   Medications Prior to Admission   Medication Sig Dispense Refill Last Dose   • fluvoxaMINE maleate  MG capsule sustained-release 24 hr Take 1 tablet by mouth Daily. 30 each 6    • ketoconazole (NIZORAL) 2 % shampoo Apply  topically to the appropriate area as directed 2 (Two) Times a Week. 1 each 6    • tamsulosin (FLOMAX) 0.4 MG capsule 24 hr capsule Take 1 capsule by mouth once daily 90 capsule 0    • tiZANidine (Zanaflex) 4 MG tablet Take 1 tablet by mouth 2 (Two) Times a Day As Needed for Muscle Spasms. 45 tablet 1    • [] Lactobacillus Acid-Pectin (Acidophilus/Pectin) capsule Take  by mouth.      , Scheduled Meds:  aztreonam, 2 g, Intravenous, Q8H  metroNIDAZOLE, 500 mg, Intravenous, Q8H  sodium chloride, 10 mL, Intravenous, Q12H  IV Fluids 1000 mL + additives, 100 mL/hr, Intravenous, Daily    , Continuous Infusions:  sodium chloride 0.9 % with KCl 20 mEq, 100 mL/hr, Last Rate: 100 mL/hr (22 0604)    , PRN Meds:  •  acetaminophen **OR** acetaminophen **OR** acetaminophen  •  Calcium Gluconate-NaCl **AND** calcium gluconate **AND** Calcium, Ionized  •  diphenoxylate-atropine  •  HYDROmorphone  •  magnesium sulfate **OR** magnesium sulfate **OR** magnesium sulfate  •  nitroglycerin  •  potassium chloride  •  potassium phosphate infusion greater than 15 mMoles **OR** potassium  phosphates **OR** potassium phosphate **OR** sodium phosphate IVPB **OR** sodium phosphate IVPB **OR** Sodium Phosphate  •  [COMPLETED] Insert peripheral IV **AND** sodium chloride  •  sodium chloride and Allergies:  Morphine and Penicillins    Objective     Vital Signs   Temp:  [98 °F (36.7 °C)-98.3 °F (36.8 °C)] 98.3 °F (36.8 °C)  Heart Rate:  [] 88  Resp:  [16-17] 17  BP: (101-127)/(65-78) 101/68    Physical Exam:       General Appearance:    Alert, cooperative, in no acute distress   Head:    Normocephalic, without obvious abnormality, atraumatic   Eyes:            Lids and lashes normal, conjunctivae and sclerae normal, no   icterus, no pallor, corneas clear   Ears:    Ears appear intact with no abnormalities noted   Lungs:     Breathing unlabored   Abdomen:     Soft, well healed upper midline incision. Palpable mass which is tender in LLQ   Extremities:   Moves all extremities well, no edema, no cyanosis, no             redness   Skin:   No bleeding, bruising or rash   Neurologic:   Cranial nerves 2 - 12 grossly intact, sensation intact       Results Review:  Lab Results (last 72 hours)     Procedure Component Value Units Date/Time    Lactic Acid, Plasma [[id number]]  (Normal) Collected: 07/03/22 0834    Specimen: Blood Updated: 07/03/22 0921     Lactate 0.8 mmol/L     Basic Metabolic Panel [[id number]]  (Abnormal) Collected: 07/03/22 0621    Specimen: Blood Updated: 07/03/22 0656     Glucose 102 mg/dL      BUN 12 mg/dL      Creatinine 0.33 mg/dL      Sodium 133 mmol/L      Potassium 3.1 mmol/L      Chloride 97 mmol/L      CO2 23.0 mmol/L      Calcium 8.3 mg/dL      BUN/Creatinine Ratio 36.4     Anion Gap 13.0 mmol/L      eGFR 123.6 mL/min/1.73      Comment: National Kidney Foundation and American Society of Nephrology (ASN) Task Force recommended calculation based on the Chronic Kidney Disease Epidemiology Collaboration (CKD-EPI) equation refit without adjustment for race.       Narrative:      GFR Normal  >60  Chronic Kidney Disease <60  Kidney Failure <15      aPTT [[id number]]  (Normal) Collected: 07/03/22 0452    Specimen: Blood Updated: 07/03/22 0545     PTT 30.5 seconds     Ammonia [[id number]]  (Normal) Collected: 07/03/22 0452    Specimen: Blood Updated: 07/03/22 0537     Ammonia 27 umol/L     Lactic Acid, Plasma [[id number]]  (Normal) Collected: 07/03/22 0452    Specimen: Blood Updated: 07/03/22 0536     Lactate 1.2 mmol/L     Calcium, Ionized [[id number]]  (Normal) Collected: 07/03/22 0452    Specimen: Blood Updated: 07/03/22 0528     Ionized Calcium 1.21 mmol/L     CBC Auto Differential [[id number]]  (Abnormal) Collected: 07/03/22 0452    Specimen: Blood Updated: 07/03/22 0525     WBC 9.00 10*3/mm3      RBC 3.58 10*6/mm3      Hemoglobin 9.5 g/dL      Hematocrit 28.8 %      MCV 80.5 fL      MCH 26.4 pg      MCHC 32.8 g/dL      RDW 17.0 %      RDW-SD 47.7 fl      MPV 5.6 fL      Platelets 601 10*3/mm3      Neutrophil % 73.6 %      Lymphocyte % 15.6 %      Monocyte % 10.3 %      Eosinophil % 0.3 %      Basophil % 0.2 %      Neutrophils, Absolute 6.60 10*3/mm3      Lymphocytes, Absolute 1.40 10*3/mm3      Monocytes, Absolute 0.90 10*3/mm3      Eosinophils, Absolute 0.00 10*3/mm3      Basophils, Absolute 0.00 10*3/mm3      nRBC 0.0 /100 WBC     Urine Drug Screen - Urine, Clean Catch [[id number]]  (Normal) Collected: 07/02/22 2237    Specimen: Urine, Clean Catch Updated: 07/03/22 0311     Amphet/Methamphet, Screen Negative     Barbiturates Screen, Urine Negative     Benzodiazepine Screen, Urine Negative     Cocaine Screen, Urine Negative     Opiate Screen Negative     THC, Screen, Urine Negative     Methadone Screen, Urine Negative     Oxycodone Screen, Urine Negative    Narrative:      Negative Thresholds Per Drugs Screened:    Amphetamines                 500 ng/ml  Barbiturates                 200 ng/ml  Benzodiazepines              100 ng/ml  Cocaine                      300 ng/ml  Methadone                    300  ng/ml  Opiates                      300 ng/ml  Oxycodone                    100 ng/ml  THC                           50 ng/ml    The Normal Value for all drugs tested is negative. This report includes final unconfirmed screening results to be used for medical treatment purposes only. Unconfirmed results must not be used for non-medical purposes such as employment or legal testing. Clinical consideration should be applied to any drug of abuse test, particularly when unconfirmed results are used.          All urine drugs of abuse requests without chain of custody are for medical screening purposes only.  False positives are possible.      Protime-INR [[id number]]  (Abnormal) Collected: 07/02/22 2144    Specimen: Blood Updated: 07/03/22 0301     Protime 13.5 Seconds      INR 1.33    Phosphorus [[id number]]  (Normal) Collected: 07/02/22 2144    Specimen: Blood Updated: 07/03/22 0259     Phosphorus 3.4 mg/dL     Magnesium [[id number]]  (Normal) Collected: 07/02/22 2144    Specimen: Blood Updated: 07/03/22 0259     Magnesium 1.8 mg/dL     Ethanol [[id number]] Collected: 07/02/22 2144    Specimen: Blood Updated: 07/03/22 0254     Ethanol % <0.010 %     Narrative:      Plasma Ethanol Clinical Symptoms:    ETOH (%)               Clinical Symptom  .01-.05              No apparent influence  .03-.12              Euphoria, Diminished judgment and attention   .09-.25              Impaired comprehension, Muscle incoordination  .18-.30              Confusion, Staggered gait, Slurred speech  .25-.40              Markedly decreased response to stimuli, unable to stand or                        walk, vomitting, sleep or stupor  .35-.50              Comatose, Anesthesia, Subnormal body temperature        COVID PRE-OP / PRE-PROCEDURE SCREENING ORDER (NO ISOLATION) - Swab, Nasopharynx [[id number]]  (Normal) Collected: 07/03/22 0225    Specimen: Swab from Nasopharynx Updated: 07/03/22 0248    Narrative:      The following orders were created  for panel order COVID PRE-OP / PRE-PROCEDURE SCREENING ORDER (NO ISOLATION) - Swab, Nasopharynx.  Procedure                               Abnormality         Status                     ---------                               -----------         ------                     COVID-19,CEPHEID/OSWALDO,CO...[[id number]]  Normal              Final result                 Please view results for these tests on the individual orders.    COVID-19,CEPHEID/OSWALDO,COR/ИРИНА/PAD/CECILIA IN-HOUSE(OR EMERGENT/ADD-ON),NP SWAB IN TRANSPORT MEDIA 3-4 HR TAT, RT-PCR - Swab, Nasopharynx [[id number]]  (Normal) Collected: 07/03/22 0225    Specimen: Swab from Nasopharynx Updated: 07/03/22 0248     COVID19 Not Detected    Narrative:      Fact sheet for providers: https://www.fda.gov/media/768486/download     Fact sheet for patients: https://www.fda.gov/media/995628/download  Fact sheet for providers: https://www.fda.gov/media/268912/download    Fact sheet for patients: https://www.fda.gov/media/637356/download    Test performed by PCR.    Comprehensive Metabolic Panel [[id number]]  (Abnormal) Collected: 07/02/22 2144    Specimen: Blood Updated: 07/02/22 2326     Glucose 112 mg/dL      BUN 12 mg/dL      Creatinine 0.43 mg/dL      Sodium 131 mmol/L      Potassium 3.1 mmol/L      Chloride 95 mmol/L      CO2 22.0 mmol/L      Calcium 8.7 mg/dL      Total Protein 6.7 g/dL      Albumin 2.60 g/dL      ALT (SGPT) 26 U/L      AST (SGOT) 50 U/L      Alkaline Phosphatase 95 U/L      Total Bilirubin 0.5 mg/dL      Globulin 4.1 gm/dL      A/G Ratio 0.6 g/dL      BUN/Creatinine Ratio 27.9     Anion Gap 14.0 mmol/L      eGFR 114.1 mL/min/1.73      Comment: National Kidney Foundation and American Society of Nephrology (ASN) Task Force recommended calculation based on the Chronic Kidney Disease Epidemiology Collaboration (CKD-EPI) equation refit without adjustment for race.       Narrative:      GFR Normal >60  Chronic Kidney Disease <60  Kidney Failure <15      C-reactive  Protein [[id number]]  (Abnormal) Collected: 07/02/22 2144    Specimen: Blood Updated: 07/02/22 2326     C-Reactive Protein 17.22 mg/dL     Blood Culture - Blood, Arm, Right [[id number]] Collected: 07/02/22 2311    Specimen: Blood from Arm, Right Updated: 07/02/22 2317    Urinalysis With Culture If Indicated - Urine, Clean Catch [[id number]]  (Normal) Collected: 07/02/22 2237    Specimen: Urine, Clean Catch Updated: 07/02/22 2249     Color, UA Yellow     Appearance, UA Clear     pH, UA 6.5     Specific Gravity, UA <=1.005     Glucose, UA Negative     Ketones, UA Negative     Bilirubin, UA Negative     Blood, UA Negative     Protein, UA Negative     Leuk Esterase, UA Negative     Nitrite, UA Negative     Urobilinogen, UA 0.2 E.U./dL    Narrative:      In absence of clinical symptoms, the presence of pyuria, bacteria, and/or nitrites on the urinalysis result does not correlate with infection.  Urine microscopic not indicated.    [place] Draw [[id number]] Collected: 07/02/22 2144    Specimen: Blood Updated: 07/02/22 2249    Narrative:      The following orders were created for panel order [place] Draw.  Procedure                               Abnormality         Status                     ---------                               -----------         ------                     Green Top (Gel)[[id number]]                                  Final result               Lavender Top[[id number]]                                     Final result               Gold Top - SST[[id number]]                                   Final result               Light Blue Top[[id number]]                                   Final result                 Please view results for these tests on the individual orders.    Light Blue Top [[id number]] Collected: 07/02/22 2144    Specimen: Blood Updated: 07/02/22 2249     Extra Tube Hold for add-ons.     Comment: Auto resulted       Blood Culture - Blood, Arm, Right [[id number]] Collected: 07/02/22 2238    Specimen:  Blood from Arm, Right Updated: 07/02/22 2245    Sedimentation Rate [[id number]]  (Abnormal) Collected: 07/02/22 2144    Specimen: Blood Updated: 07/02/22 2236     Sed Rate 100 mm/hr     CBC & Differential [[id number]]  (Abnormal) Collected: 07/02/22 2144    Specimen: Blood Updated: 07/02/22 2232    Narrative:      The following orders were created for panel order CBC & Differential.  Procedure                               Abnormality         Status                     ---------                               -----------         ------                     CBC Auto Differential[[id number]]        Abnormal            Final result                 Please view results for these tests on the individual orders.    CBC Auto Differential [[id number]]  (Abnormal) Collected: 07/02/22 2144    Specimen: Blood Updated: 07/02/22 2232     WBC 10.50 10*3/mm3      RBC 3.56 10*6/mm3      Hemoglobin 9.3 g/dL      Hematocrit 28.3 %      MCV 79.6 fL      MCH 26.2 pg      MCHC 32.9 g/dL      RDW 17.4 %      RDW-SD 48.1 fl      MPV 6.1 fL      Platelets 645 10*3/mm3      Neutrophil % 71.4 %      Lymphocyte % 16.9 %      Monocyte % 11.3 %      Eosinophil % 0.2 %      Basophil % 0.2 %      Neutrophils, Absolute 7.50 10*3/mm3      Lymphocytes, Absolute 1.80 10*3/mm3      Monocytes, Absolute 1.20 10*3/mm3      Eosinophils, Absolute 0.00 10*3/mm3      Basophils, Absolute 0.00 10*3/mm3      nRBC 0.0 /100 WBC     Lavender Top [[id number]] Collected: 07/02/22 2144    Specimen: Blood Updated: 07/02/22 2230     Extra Tube DONE    Gold Top - SST [[id number]] Collected: 07/02/22 2144    Specimen: Blood Updated: 07/02/22 2155     Extra Tube hold    Green Top (Gel) [[id number]] Collected: 07/02/22 2144    Specimen: Blood Updated: 07/02/22 2155     Extra Tube hold        Imaging Results (Last 72 Hours)     Procedure Component Value Units Date/Time    CT Abdomen Pelvis With Contrast [[id number]] Collected: 07/03/22 0122     Updated: 07/03/22 0130    Narrative:       CT OF THE ABDOMEN AND PELVIS WITH CONTRAST    Clinical indication: Abdominal pain.    Comparison: None.    Procedure: 100 cc Isovue-370 were administered intravenously without incident. CT images of the abdomen and pelvis were obtained.  CT dose lowering techniques were used, to include: automated exposure control, adjustment for patient size, and or use of   iterative reconstruction.    Findings:     Lung Bases: Lung bases are clear. No pleural effusion. Heart size is normal without pericardial effusion.    Liver and biliary system: The liver is normal in size and configuration without focal lesion. No intra or extrahepatic biliary ductal dilatation is noted. The gallbladder is normal without stones, wall thickening or adjacent fluid.     Pancreas: The pancreas is unremarkable.     Spleen: The spleen is normal.    Adrenals: The adrenal glands are within normal limits.     Kidneys: Bilateral mild hydroureteronephrosis.    Gastrointestinal: The stomach and scattered loops of small and large bowel have a nonobstructive pattern. Appendectomy. There is segmental distal colonic wall thickening through the sigmoid colon. Sigmoid colonic mucosal fold thickening and   hyperenhancement are present. Air-fluid levels are seen in the rectum. There is an enhancing fluid tract which extends from the rectum to the sigmoid colon (image 119 of series 7). A questioned enhancing fistulous tract extends from 2 loops of the   sigmoid colon and the mid pelvis on image 110 of series 7, possibly involving the posterior bladder wall.    Mesentery and retroperitoneum: No mesenteric or retroperitoneal adenopathy. No abnormal fluid collection, mass or free air.     Pelvis: Posterior urinary bladder wall thickening. Rectum is normal. No free fluid. No deep pelvic or inguinal adenopathy.     Reproductive: No acute abnormality.    Body wall: Normal.    Bones: No acute osseous abnormality. Multilevel degenerative changes are seen in the  spine.      Impression:      Impression:   1. Segmental marked distal colonic wall and mucosal fold thickening and hyperenhancement suspicious for infectious or inflammatory colitis. 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   Fistulous involvement of the posterior urinary bladder is possible. Underlying, infiltrative colonic neoplasm such as lymphoma in this location is an alternate consideration.  2. Bilateral mild hydronephrosis, likely resulting from distal ureteral ileus.    Electronically signed by:  Juancho Bedoya M.D.    7/2/2022 11:29 PM          I reviewed the patient's new clinical results.  I reviewed the patient's new imaging results and agree with the interpretation.  I reviewed the patient's other test results and agree with the interpretation      Assessment & Plan       Colitis    Essential hypertension    Chronic hypokalemia    Left foot drop    Unintentional weight loss    History of alcohol abuse    S/P colon resection      72-year-old gentleman with recent ileocolic resection at [facility] for what may have been inflammatory bowel disease.  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  This also may be consistent with Crohn's disease.    I discussed this with he and his wife.  I also discussed that I believe he should continue to follow-up with his established surgeon.  They are agreeable to this.  Ultimately, he likely will require surgery for the inflammation of his sigmoid colon.  Ideally he would be evaluated by gastroenterology as I believe he likely has inflammatory bowel disease.  If that is the case he may be a candidate for medical treatment as opposed to surgery.  In any case, he and his wife are in agreement that they will see their already established surgeon.  If he is unable to be transferred to that facility or have follow-up with his surgeon please let me know and I will continue to follow and care for  him.    I discussed the patient's findings and my recommendations with patient, family and primary care team              This document has been electronically signed by Thee Chaudhari MD on July 3, 2022 11:19 EDT      Thee Chaudhari MD  07/03/22  11:19 EDT

## 2022-07-03 NOTE — H&P
You    [facility] Medicine Services      Patient Name: Pramod Trent  : 1950  MRN: [id number]  Primary Care Physician:  Shanel Bautista MD  Date of admission: 2022      Subjective      Chief Complaint: Abdominal pain    History of Present Illness:  Pramod Trent is a 72 y.o. male w/PMH of former smoker, alcohol use, anxiety, asthma, basal cell carcinoma, BPH, hypokalemia, enterocolitis, s/p ileocecal resection (05/10/2022), chronic pain who presents to [facility] ED due to abdominal pain.  Wife at bedside reports patient's abdominal and pelvic pain has progressively worsened.  States patient has had fevers, chills, fatigue, weakness, diarrhea without improvement.  Unable to complete review of system this time due to acuity of condition.      Upon arrival to the ED vitals temp 98.0, HR 88, RR 16, /68, O2 sat 96% on room air.  Labs notable for glucose 112, , K3.1, CO2 22, creatinine 0.43, BUN 12, ALT 26, AST 50, albumin 2.60, CRP 17.22, WBC 10.50, Hgb 9.3, platelets 645, neutrophils 71.4.  Sed rate 100.  UA negative.  CT abdomen/pelvis with contrast shows segmental marked distal colonic wall and mucosal fold thickening and hyperenhancement suspicious for infectious or inflammatory colitis. Several interloop fistulas are suspected between portions of the sigmoid colon . Fistulous involvement of the posterior urinary bladder is possible. Underlying, infiltrative colonic neoplasm such as lymphoma in this location is an alternate consideration. Bilateral mild hydronephrosis, likely resulting from distal ureteral ileus.  Patient treated in ED with IV Zofran, IV Dilaudid.  ED provider spoke with general surgery who requested admission, n.p.o. status and antibiotics.      Chart review shows:  2022-inpatient admission to [facility]  for ileus, small bowel obstruction, UTI, diarrhea, JOSIE, colonic diverticulitis.  05/10/2022,S/p Exploratory laparotomy, lysis  of adhesion, ileocecal resection with primary anastomosis. Developed an ileus postoperatively, NG removed on 05/18/2022.  DC'd home on GI soft diet.  05/30/2022-inpatient admission to [facility] for enterocolitis, ileus following GI surgery.Patient was admitted and started on IV fluids and IV antibiotics for enterocolitis. C diff negative, shiga toxin negative, stool cultures negative. His symptoms improved quickly over the course of 2 days, and he was discharged home with cipro/flagyl/probiotic.  06/08/2022-F/U with surgery. 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  06/17/2022-F/U with PCP. 30 lb unintentional weight loss in 3 weeks, left foot drop.  06/22/2022-F/U with PCP appt cancelled by pt  06/24/2022-07/01/2022-multiple calls to PCP requesting pain medication, PCP advised patient should see surgery, may require f/u CT scan.          Review of Systems   Unable to perform ROS: acuity of condition        Personal History     Past Medical History:   Diagnosis Date   • Anxiety 10/24/2019   • Chronic pain syndrome 10/24/2019   • DDD (degenerative disc disease), cervical 10/24/2019   • Erectile dysfunction due to diseases classified elsewhere 10/24/2019   • Post-traumatic osteoarthritis of one knee, right 9/5/2018    Overview:  Added automatically from request for surgery [id number]       Past Surgical History:   Procedure Laterality Date   • REPLACEMENT TOTAL KNEE Right    • TONSILLECTOMY         Family History: family history includes Hypertension in his mother. Otherwise pertinent FHx was reviewed and not pertinent to current issue.    Social History:  reports that he quit smoking about 20 years ago. His smoking use included cigarettes and cigarettes. He has a 30.00 pack-year smoking history. He has quit using smokeless tobacco.  His smokeless tobacco use included chew. He reports current alcohol use. He reports that he does  not use drugs.    Home Medications:  Prior to Admission Medications     Prescriptions Last Dose Informant Patient Reported? Taking?    fluvoxaMINE maleate  MG capsule sustained-release 24 hr   No No    Take 1 tablet by mouth Daily.    HYDROcodone-acetaminophen (NORCO) 5-325 MG per tablet   No No    Take 1 tablet by mouth Every 12 (Twelve) Hours As Needed for Moderate Pain  for up to 30 days.    ketoconazole (NIZORAL) 2 % shampoo   No No    Apply  topically to the appropriate area as directed 2 (Two) Times a Week.    Lactobacillus Acid-Pectin (Acidophilus/Pectin) capsule   Yes No    Take  by mouth.    tamsulosin (FLOMAX) 0.4 MG capsule 24 hr capsule   No No    Take 1 capsule by mouth once daily    tiZANidine (Zanaflex) 4 MG tablet   No No    Take 1 tablet by mouth 2 (Two) Times a Day As Needed for Muscle Spasms.            Allergies:  Allergies   Allergen Reactions   • Morphine Hives     RASH,FEVER   • Penicillins Rash     RASH,FEVER       Objective      Vitals:   Temp:  [98 °F (36.7 °C)] 98 °F (36.7 °C)  Heart Rate:  [] 91  Resp:  [16] 16  BP: (105-127)/(65-78) 115/69    Physical Exam  Vitals and nursing note reviewed.   Constitutional:       Appearance: He is cachectic. He is ill-appearing.   HENT:      Head: Normocephalic and atraumatic. No raccoon eyes or abrasion.      Right Ear: External ear normal.      Left Ear: External ear normal.      Nose: Nose normal.      Mouth/Throat:      Mouth: Mucous membranes are dry.   Eyes:      General:         Right eye: No discharge.         Left eye: No discharge.   Cardiovascular:      Rate and Rhythm: Normal rate and regular rhythm.      Pulses: Decreased pulses.   Pulmonary:      Effort: Pulmonary effort is normal.      Breath sounds: Normal air entry. No wheezing or rhonchi.   Abdominal:      General: Bowel sounds are decreased.      Tenderness: There is abdominal tenderness in the periumbilical area and suprapubic area. There is no guarding.    Musculoskeletal:      Cervical back: No edema or erythema.   Skin:     General: Skin is warm and dry.      Coloration: Skin is sallow.   Neurological:      Mental Status: He is lethargic.   Psychiatric:         Attention and Perception: He is inattentive.         Speech: Speech is delayed.         Behavior: Behavior is slowed.            Result Review:  I have personally reviewed the results from the time of this admission to 7/3/2022 02:56 EDT and agree with these findings:  [x]  Laboratory  [x]  Microbiology  [x]  Radiology  [x]  EKG/Telemetry   [x]  Cardiology/Vascular   []  Pathology  []  Old records  []  Other:            Assessment & Plan        Active Hospital Problems:  Active Hospital Problems    Diagnosis    • **Colitis    • Chronic hypokalemia    • Unintentional weight loss    • Essential hypertension    • History of alcohol abuse    • S/P colon resection    • Left foot drop      Colitis/unintentional weight loss/s/p colon resection:  -CT abdomen/pel with contrast reviewed  -ED provider consulted surgery, requested admission will see patient in a.m.  -Strict n.p.o. until cleared by surgery  -IV Azactam, IV Flagyl  -Continuous cardiac monitoring  -Trend lactic acids 6 hours x2 to monitor for potential sepsis    Chronic hypokalemia:  -Upon arrival to the ED K3.1  -NS KCL 20 meq 100 ml/hour continuous IV infusion  -Electrolyte protocol ordered  -Trend BMP  -Continuous cardiac monitoring    Essential hypertension:  -Encourage lifestyle modifications  -Low-sodium diet when cleared for oral intake by surgery  -Reconcile medication, resume as appropriate    Left foot drop:  -Fall precautions    ETOH abuse:  -Initiate EtOH withdrawal protocol  -Banana bag daily   -Seizure precautions  -Ammonia level, ethanol level  -Consider outpatient referral for substance abuse and counseling        DVT prophylaxis:  Mechanical DVT prophylaxis orders are present.                Home medications verified by nursing and/or  pharmacy prior to provider review, resume medications as appropriate after medication reconciliation is completed      CODE STATUS:    Code Status (Patient has no pulse and is not breathing): CPR (Attempt to Resuscitate)  Medical Interventions (Patient has pulse or is breathing): Full Support    Admission Status:  I believe this patient meets observation status.          The patient was interviewed wearing appropriate personal protective equipment.      Signature: Electronically signed by ANTONINA Evans, 07/03/22, 5:56 AM EDT.    Hospitalist Physician Assessment/Plan    History:     72 y.o. male w/PMH of former smoker, alcohol use, anxiety, asthma, basal cell carcinoma, BPH, hypokalemia, enterocolitis, s/p ileocecal resection (05/10/2022), chronic pain who presents to [facility] ED due to abdominal pain.  Wife at bedside reports patient's abdominal and pelvic pain has progressively worsened.  States patient has had fevers, chills, fatigue, weakness, diarrhea without improvement.  Unable to complete review of system this time due to acuity of condition.      Upon arrival to the ED vitals temp 98.0, HR 88, RR 16, /68, O2 sat 96% on room air.  Labs notable for glucose 112, , K3.1, CO2 22, creatinine 0.43, BUN 12, ALT 26, AST 50, albumin 2.60, CRP 17.22, WBC 10.50, Hgb 9.3, platelets 645, neutrophils 71.4.  Sed rate 100.  UA negative.  CT abdomen/pelvis with contrast shows segmental marked distal colonic wall and mucosal fold thickening and hyperenhancement suspicious for infectious or inflammatory colitis. Several interloop fistulas are suspected between portions of the sigmoid colon . Fistulous involvement of the posterior urinary bladder is possible. Underlying, infiltrative colonic neoplasm such as lymphoma in this location is an alternate consideration. Bilateral mild hydronephrosis, likely resulting from distal ureteral ileus.  Patient treated in ED with IV Zofran, IV Dilaudid.  ED  provider spoke with general surgery who requested admission, n.p.o. status and antibiotics.      Exam:    GENERAL APPEARANCE: Well developed, well nourished, alert and cooperative, and appears to be in no acute distress.  HEAD: normocephalic.  EYES: PERRL, EOMI. vision intact grossly.  EARS: Intact hearing.  No gross abnormalities.  NOSE: No nasal discharge.  THROAT: Clear   NECK: Neck supple, non-tender without lymphadenopathy, masses or thyromegaly.  CARDIAC: Normal S1 and S2. No S3, S4 or murmurs. Rhythm is regular. There is no peripheral edema, cyanosis or pallor. Extremities are warm and well perfused. Capillary refill is less than 2 seconds. No carotid bruits.  LUNGS: Clear to auscultation and percussion without rales, rhonchi, wheezing or diminished breath sounds.  ABDOMEN: Left lower quadrant and suprapubic tenderness.  No rebound or rigidity.  Abdomen soft.  MUSKULOSKELETAL: No deformity or swelling   BACK: No abnormalities noted     EXTREMITIES: No significant deformity or joint abnormality. No edema. Peripheral pulses intact. No varicosities.  LOWER EXTREMITY: No edema or swelling  NEUROLOGICAL: CN II-XII intact. Strength and sensation symmetric and intact throughout. Reflexes 2+ throughout. Cerebellar testing normal.  SKIN: Skin normal color, texture and turgor with no lesions or eruptions.  PSYCHIATRIC: Alert cooperative not suicidal       Medical Decision Making:  Agree with antibiotics IV fluids and pain control  Transfer to [place] for follow-up with his original surgeon  Discussed with Dr. Chaudhari and family      Attending Physician Attestation    For this patient encounter, I have reviewed the mid-level provider documentation, medical decision making and treatment plan, and I have personally spent time with the patient.  All procedures were done by me, and/or all procedures were performed by the mid-level under my supervision.

## 2022-07-03 NOTE — DISCHARGE SUMMARY
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         Hospital Course     Hospital Course:  Pramod Trent is a 72 y.o. male         History of Present Illness:  Pramod Trent is a 72 y.o. male w/PMH of former smoker, alcohol use, anxiety, asthma, basal cell carcinoma, BPH, hypokalemia, enterocolitis, s/p ileocecal resection (05/10/2022), chronic pain who presents to [facility] ED due to abdominal pain.  Wife at bedside reports patient's abdominal and pelvic pain has progressively worsened.  States patient has had fevers, chills, fatigue, weakness, diarrhea without improvement.  Unable to complete review of system this time due to acuity of condition.        Upon arrival to the ED vitals temp 98.0, HR 88, RR 16, /68, O2 sat 96% on room air.  Labs notable for glucose 112, , K3.1, CO2 22, creatinine 0.43, BUN 12, ALT 26, AST 50, albumin 2.60, CRP 17.22, WBC 10.50, Hgb 9.3, platelets 645, neutrophils 71.4.  Sed rate 100.  UA negative.  CT abdomen/pelvis with contrast shows segmental marked distal colonic wall and mucosal fold thickening and hyperenhancement suspicious for infectious or inflammatory colitis. Several  interloop fistulas are suspected between portions of the sigmoid colon . Fistulous involvement of the posterior urinary bladder is possible. Underlying, infiltrative colonic neoplasm such as lymphoma in this location is an alternate consideration. Bilateral mild hydronephrosis, likely resulting from distal ureteral ileus.  Patient treated in ED with IV Zofran, IV Dilaudid.  ED provider spoke with general surgery who requested admission, n.p.o. status and antibiotics.        Chart review shows:  05/07/2022-inpatient admission to [facility]  for ileus, small bowel obstruction, UTI, diarrhea, JOSIE, colonic diverticulitis.  05/10/2022,S/p Exploratory laparotomy, lysis of adhesion, ileocecal resection with primary anastomosis. Developed an ileus postoperatively, NG removed on 05/18/2022.  DC'd home on GI soft diet.  05/30/2022-inpatient admission to [facility] for enterocolitis, ileus following GI surgery.Patient was admitted and started on IV fluids and IV antibiotics for enterocolitis. C diff negative, shiga toxin negative, stool cultures negative. His symptoms improved quickly over the course of 2 days, and he was discharged home with cipro/flagyl/probiotic.  06/08/2022-F/U with surgery. 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  06/17/2022-F/U with PCP. 30 lb unintentional weight loss in 3 weeks, left foot drop.  06/22/2022-F/U with PCP appt cancelled by pt  06/24/2022-07/01/2022-multiple calls to PCP requesting pain medication, PCP advised patient should see surgery, may require f/u CT scan.    Assessment/plan           Colitis/unintentional weight loss/s/p colon resection:  -CT abdomen/pel with contrast reviewed  -ED provider consulted surgery, requested admission will see patient in a.m.  -Strict n.p.o. until cleared by surgery  -IV Azactam, IV Flagyl  -Continuous cardiac monitoring  -Trend lactic acids 6 hours x2 to  monitor for potential sepsis  Transfer to [place] to continue follow-up with his original surgeon Dr. Dowd weeks.  Discussed with Dr. Chaudhari, general surgeon here who requested to transfer  .  Accepting physician Dr. Clara Kellogg at [place] [facility]    Chronic hypokalemia:  -Upon arrival to the ED K3.1  -NS KCL 20 meq 100 ml/hour continuous IV infusion  -Electrolyte protocol ordered  -Trend BMP  -Continuous cardiac monitoring     Essential hypertension:  -Encourage lifestyle modifications  -Low-sodium diet when cleared for oral intake by surgery  -Reconcile medication, resume as appropriate     Left foot drop:  -Fall precautions     ETOH abuse:  -Initiate EtOH withdrawal protocol  -Banana bag daily   -Seizure precautions  -Ammonia level, ethanol level  -Consider outpatient referral for substance abuse and counseling                   DISCHARGE Follow Up Recommendations for labs and diagnostics:         Reasons For Change In Medications and Indications for New Medications:      Day of Discharge     Vital Signs:  Temp:  [98 °F (36.7 °C)-98.3 °F (36.8 °C)] 98 °F (36.7 °C)  Heart Rate:  [] 90  Resp:  [16-19] 19  BP: (101-127)/(65-78) 103/70    Physical Exam:  Physical Exam .  Awake alert oriented x3 abdomen is tender in the lower quadrants without rigidity  No acute distress      Pertinent  and/or Most Recent Results     LAB RESULTS:      Lab 07/03/22  1254 07/03/22  0834 07/03/22 0452 07/02/22 2144   WBC  --   --  9.00 10.50   HEMOGLOBIN  --   --  9.5* 9.3*   HEMATOCRIT  --   --  28.8* 28.3*   PLATELETS  --   --  601* 645*   NEUTROS ABS  --   --  6.60 7.50*   LYMPHS ABS  --   --  1.40 1.80   MONOS ABS  --   --  0.90 1.20*   EOS ABS  --   --  0.00 0.00   MCV  --   --  80.5 79.6   SED RATE  --   --   --  100*   CRP  --   --   --  17.22*   LACTATE 1.1 0.8 1.2  --    PROTIME  --   --   --  13.5*   APTT  --   --  30.5  --          Lab 07/03/22  1254 07/03/22  0621 07/03/22 0452 07/02/22 2144   SODIUM  133* 133*  --  131*   POTASSIUM 3.5 3.1*  --  3.1*   CHLORIDE 100 97*  --  95*   CO2 21.0* 23.0  --  22.0   ANION GAP 12.0 13.0  --  14.0   BUN 12 12  --  12   CREATININE 0.37* 0.33*  --  0.43*   EGFR 119.4 123.6  --  114.1   GLUCOSE 87 102*  --  112*   CALCIUM 8.1* 8.3*  --  8.7   IONIZED CALCIUM  --   --  1.21  --    MAGNESIUM  --   --   --  1.8   PHOSPHORUS  --   --   --  3.4         Lab 07/02/22  2144   TOTAL PROTEIN 6.7   ALBUMIN 2.60*   GLOBULIN 4.1   ALT (SGPT) 26   AST (SGOT) 50*   BILIRUBIN 0.5   ALK PHOS 95         Lab 07/02/22 2144   PROTIME 13.5*   INR 1.33*                 Brief Urine Lab Results  (Last result in the past 365 days)      Color   Clarity   Blood   Leuk Est   Nitrite   Protein   CREAT   Urine HCG        07/02/22 2237 Yellow   Clear   Negative   Negative   Negative   Negative               Microbiology Results (last 10 days)     Procedure Component Value - Date/Time    COVID PRE-OP / PRE-PROCEDURE SCREENING ORDER (NO ISOLATION) - Swab, Nasopharynx [[id number]]  (Normal) Collected: 07/03/22 0225    Lab Status: Final result Specimen: Swab from Nasopharynx Updated: 07/03/22 0248    Narrative:      The following orders were created for panel order COVID PRE-OP / PRE-PROCEDURE SCREENING ORDER (NO ISOLATION) - Swab, Nasopharynx.  Procedure                               Abnormality         Status                     ---------                               -----------         ------                     COVID-19,CEPHEID/OSWALDO,CO...[[id number]]  Normal              Final result                 Please view results for these tests on the individual orders.    COVID-19,CEPHEID/OSWALDO,COR/ИРИНА/PAD/CECILIA IN-HOUSE(OR EMERGENT/ADD-ON),NP SWAB IN TRANSPORT MEDIA 3-4 HR TAT, RT-PCR - Swab, Nasopharynx [[id number]]  (Normal) Collected: 07/03/22 0225    Lab Status: Final result Specimen: Swab from Nasopharynx Updated: 07/03/22 0248     COVID19 Not Detected    Narrative:      Fact sheet for providers:  https://www.fda.gov/media/359429/download     Fact sheet for patients: https://www.fda.gov/media/761568/download  Fact sheet for providers: https://www.fda.gov/media/118983/download    Fact sheet for patients: https://www.fda.gov/media/675734/download    Test performed by PCR.          CT Abdomen Pelvis With Contrast    Result Date: 7/3/2022  Impression: Impression: 1. Segmental marked distal colonic wall and mucosal fold thickening and hyperenhancement suspicious for infectious or inflammatory colitis. 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 Fistulous involvement of the posterior urinary bladder is possible. Underlying, infiltrative colonic neoplasm such as lymphoma in this location is an alternate consideration. 2. Bilateral mild hydronephrosis, likely resulting from distal ureteral ileus. Electronically signed by:  Juancho Bedoya M.D.  7/2/2022 11:29 PM                  Labs Pending at Discharge:  Pending Labs     Order Current Status    Blood Culture - Blood, Arm, Right In process    Blood Culture - Blood, Arm, Right In process          Procedures Performed           Consults:   Consults     Date and Time Order Name Status Description    7/3/2022  1:46 AM Surgery (on-call MD unless specified) Completed     7/3/2022  1:46 AM Hospitalist (on-call MD unless specified)              Discharge Details        Discharge Medications      Continue These Medications      Instructions Start Date   fluvoxaMINE maleate  MG capsule sustained-release 24 hr   1 tablet, Oral, Daily      ketoconazole 2 % shampoo  Commonly known as: NIZORAL   Topical, 2 Times Weekly      tamsulosin 0.4 MG capsule 24 hr capsule  Commonly known as: FLOMAX   Take 1 capsule by mouth once daily      tiZANidine 4 MG tablet  Commonly known as: Zanaflex   4 mg, Oral, 2 Times Daily PRN         Stop These Medications    Acidophilus/Pectin capsule            Allergies   Allergen  Reactions   • Morphine Hives     RASH,FEVER   • Penicillins Rash     RASH,FEVER         Discharge Disposition:     Short Term Hospital (DC - External)    Diet:  Hospital:  Diet Order   Procedures   • NPO Diet NPO Type: Strict NPO         Discharge Activity:         CODE STATUS:  Code Status and Medical Interventions:   Ordered at: 07/03/22 0244     Code Status (Patient has no pulse and is not breathing):    CPR (Attempt to Resuscitate)     Medical Interventions (Patient has pulse or is breathing):    Full Support         Future Appointments   Date Time Provider Department Center   7/15/2022  1:15 PM Shanel Bautista MD [facility]           Time spent on Discharge including face to face service:  45 minutes    This patient has been examined wearing appropriate Personal Protective Equipment and discussed with hospital infection control department. 07/03/22      Signature: Electronically signed by Sriram Reed MD, 07/03/22, 1:40 PM EDT.  [facility] Hospitalist Team

## 2022-07-04 NOTE — CASE MANAGEMENT/SOCIAL WORK
Case Management Discharge Note      Final Note: [facility].         Selected Continued Care - Discharged on 7/3/2022 Admission date: 7/2/2022 - Discharge disposition: Short Term Hospital (DC - External)     Transportation Services  Private: Car    Final Discharge Disposition Code: 02 - short term hospital for  care

## 2022-07-07 ENCOUNTER — TELEPHONE (OUTPATIENT)
Dept: FAMILY MEDICINE CLINIC | Facility: CLINIC | Age: 72
End: 2022-07-07

## 2022-07-07 LAB
BACTERIA SPEC AEROBE CULT: NORMAL
BACTERIA SPEC AEROBE CULT: NORMAL

## 2022-07-07 NOTE — TELEPHONE ENCOUNTER
Caller: TOMMIE     Relationship: [facility]     Best call back number:[phone]    What orders are you requesting (i.e. lab or imaging): HOME HEALTH    In what timeframe would the patient need to come in: TOMMIE     Where will you receive your lab/imaging services: [facility]  Additional notes:     TOMMIE WOULD LIKE TO KNOW IF DR. DRIVER WILL SIGN OFF ON HOME HEALTH . ALBARO WAS DISCHARGED FROM [facility] 7/7/2022

## 2022-08-16 RX ORDER — KETOCONAZOLE 20 MG/ML
SHAMPOO TOPICAL 2 TIMES WEEKLY
Qty: 1 EACH | Refills: 6 | Status: SHIPPED | OUTPATIENT
Start: 2022-08-18

## 2022-09-06 RX ORDER — TAMSULOSIN HYDROCHLORIDE 0.4 MG/1
1 CAPSULE ORAL DAILY
Qty: 90 CAPSULE | Refills: 1 | Status: SHIPPED | OUTPATIENT
Start: 2022-09-06

## 2022-09-28 ENCOUNTER — TELEPHONE (OUTPATIENT)
Dept: FAMILY MEDICINE CLINIC | Facility: CLINIC | Age: 72
End: 2022-09-28

## 2022-09-28 RX ORDER — DICLOFENAC SODIUM 75 MG/1
75 TABLET, DELAYED RELEASE ORAL 2 TIMES DAILY
Qty: 60 TABLET | Refills: 2 | Status: SHIPPED | OUTPATIENT
Start: 2022-09-28 | End: 2023-01-03

## 2022-09-28 RX ORDER — CITALOPRAM 10 MG/1
10 TABLET ORAL DAILY
Qty: 90 TABLET | Refills: 1 | Status: SHIPPED | OUTPATIENT
Start: 2022-09-28 | End: 2023-01-31

## 2022-09-28 NOTE — TELEPHONE ENCOUNTER
Caller: Pramod Trent    Relationship: Self    Best call back number: [phone]  What is the best time to reach you ANYTIME   Who are you requesting to speak with (clinical staff, provider,  specific staff member):CLINICAL STAFF  Do you know the name of the person who called:SELF    What was the call regarding: PATIENT CALLED AND STATED IS CELEXA WAS PRESCRIBED BY THE HOSPITAL DOCTOR WHEN HE WAS ADMITTED. PATIENT STATES HE IS OUT OF MEDICATION ON CELEXA AND DICLOFENAC. PLEASE REFILL AND SEND TO HIS PHARMACY AT [facility] ON [facility].   Do you require a callback: YES

## 2022-10-09 ENCOUNTER — PATIENT MESSAGE (OUTPATIENT)
Dept: FAMILY MEDICINE CLINIC | Facility: CLINIC | Age: 72
End: 2022-10-09

## 2022-10-10 RX ORDER — HYDROXYZINE HYDROCHLORIDE 25 MG/1
25 TABLET, FILM COATED ORAL EVERY 8 HOURS PRN
Qty: 60 TABLET | Refills: 2 | Status: SHIPPED | OUTPATIENT
Start: 2022-10-10 | End: 2022-12-09

## 2022-10-10 NOTE — TELEPHONE ENCOUNTER
From: Pramod Trent  To: Shanel Bautista MD  Sent: 10/9/2022 12:15 PM EDT  Subject: Hydroxzine 25 mg take 1 three times a day for anxiety    Ordered by hospital doctor in august for his anxiety

## 2022-10-19 ENCOUNTER — PATIENT MESSAGE (OUTPATIENT)
Dept: FAMILY MEDICINE CLINIC | Facility: CLINIC | Age: 72
End: 2022-10-19

## 2022-10-19 RX ORDER — UREA 10 %
1 LOTION (ML) TOPICAL DAILY
Qty: 100 TABLET | Refills: 1 | Status: SHIPPED | OUTPATIENT
Start: 2022-10-19

## 2022-10-19 RX ORDER — UREA 10 %
1 LOTION (ML) TOPICAL
COMMUNITY
Start: 2022-07-07 | End: 2022-10-19 | Stop reason: SDUPTHER

## 2022-10-19 NOTE — TELEPHONE ENCOUNTER
From: Pramod Trent  To: Shanel Bautista MD  Sent: 10/19/2022 2:29 PM EDT  Subject: Lactobacillus tabs    Needs refill. Last fill 07/7/2022. Or does he need to refill this

## 2022-11-13 ENCOUNTER — PATIENT MESSAGE (OUTPATIENT)
Dept: FAMILY MEDICINE CLINIC | Facility: CLINIC | Age: 72
End: 2022-11-13

## 2022-11-13 DIAGNOSIS — Z90.49 S/P COLON RESECTION: Primary | Chronic | ICD-10-CM

## 2022-11-13 RX ORDER — ONDANSETRON 4 MG/1
TABLET, ORALLY DISINTEGRATING ORAL
COMMUNITY
Start: 2022-08-08 | End: 2022-12-09

## 2022-11-13 RX ORDER — FAMOTIDINE 20 MG/1
TABLET, FILM COATED ORAL
COMMUNITY
Start: 2022-08-08 | End: 2022-12-09

## 2022-11-13 RX ORDER — TRAMADOL HYDROCHLORIDE 50 MG/1
50 TABLET ORAL EVERY 12 HOURS PRN
Qty: 60 TABLET | Refills: 0 | Status: SHIPPED | OUTPATIENT
Start: 2022-11-13 | End: 2022-12-12 | Stop reason: SDUPTHER

## 2022-11-13 RX ORDER — HYDROXYZINE HYDROCHLORIDE 25 MG/1
25 TABLET, FILM COATED ORAL
COMMUNITY
Start: 2022-08-05 | End: 2022-12-09

## 2022-11-13 RX ORDER — TRAMADOL HYDROCHLORIDE 50 MG/1
50 TABLET ORAL EVERY 12 HOURS PRN
COMMUNITY
Start: 2022-10-09 | End: 2022-11-13 | Stop reason: SDUPTHER

## 2022-11-13 NOTE — TELEPHONE ENCOUNTER
From: Pramod Trent  To: Shanel Bautista MD  Sent: 11/13/2022 1:32 PM EST  Subject: Prescription refill    I need a refill on my Tramadol. I have an appt on Dec 9th but I am completely out.

## 2022-11-23 ENCOUNTER — TELEPHONE (OUTPATIENT)
Dept: FAMILY MEDICINE CLINIC | Facility: CLINIC | Age: 72
End: 2022-11-23

## 2022-11-23 NOTE — TELEPHONE ENCOUNTER
Caller: Pramod Trent    Relationship: Self    Best call back number: [phone]    What medication are you requesting: MOUTHWASH FOR THRUSH    What are your current symptoms: WHITE PATCHES AND SORE MOUTH    How long have you been experiencing symptoms: FEW DAYS  Have you had these symptoms before:    [] Yes  [] No    Have you been treated for these symptoms before:   [] Yes  [] No    If a prescription is needed, what is your preferred pharmacy and phone number: [address] [place] [address] [phone] [facility] [phone]      Additional notes:

## 2022-12-09 ENCOUNTER — LAB (OUTPATIENT)
Dept: FAMILY MEDICINE CLINIC | Facility: CLINIC | Age: 72
End: 2022-12-09

## 2022-12-09 ENCOUNTER — OFFICE VISIT (OUTPATIENT)
Dept: FAMILY MEDICINE CLINIC | Facility: CLINIC | Age: 72
End: 2022-12-09
Payer: MEDICARE

## 2022-12-09 VITALS
RESPIRATION RATE: 16 BRPM | HEIGHT: 72 IN | WEIGHT: 156 LBS | HEART RATE: 82 BPM | TEMPERATURE: 96.6 F | OXYGEN SATURATION: 100 % | BODY MASS INDEX: 21.13 KG/M2 | DIASTOLIC BLOOD PRESSURE: 69 MMHG | SYSTOLIC BLOOD PRESSURE: 111 MMHG

## 2022-12-09 DIAGNOSIS — Z12.5 SCREENING FOR PROSTATE CANCER: ICD-10-CM

## 2022-12-09 DIAGNOSIS — I10 ESSENTIAL HYPERTENSION: ICD-10-CM

## 2022-12-09 DIAGNOSIS — G89.4 CHRONIC PAIN SYNDROME: ICD-10-CM

## 2022-12-09 DIAGNOSIS — Z00.00 PREVENTATIVE HEALTH CARE: ICD-10-CM

## 2022-12-09 DIAGNOSIS — B37.0 THRUSH: Primary | ICD-10-CM

## 2022-12-09 DIAGNOSIS — Z00.00 MEDICARE ANNUAL WELLNESS VISIT, SUBSEQUENT: ICD-10-CM

## 2022-12-09 DIAGNOSIS — R63.4 UNINTENTIONAL WEIGHT LOSS: ICD-10-CM

## 2022-12-09 PROCEDURE — 1125F AMNT PAIN NOTED PAIN PRSNT: CPT | Performed by: INTERNAL MEDICINE

## 2022-12-09 PROCEDURE — 84443 ASSAY THYROID STIM HORMONE: CPT | Performed by: INTERNAL MEDICINE

## 2022-12-09 PROCEDURE — 36415 COLL VENOUS BLD VENIPUNCTURE: CPT | Performed by: INTERNAL MEDICINE

## 2022-12-09 PROCEDURE — 80061 LIPID PANEL: CPT | Performed by: INTERNAL MEDICINE

## 2022-12-09 PROCEDURE — G0009 ADMIN PNEUMOCOCCAL VACCINE: HCPCS | Performed by: INTERNAL MEDICINE

## 2022-12-09 PROCEDURE — G0103 PSA SCREENING: HCPCS | Performed by: INTERNAL MEDICINE

## 2022-12-09 PROCEDURE — G0439 PPPS, SUBSEQ VISIT: HCPCS | Performed by: INTERNAL MEDICINE

## 2022-12-09 PROCEDURE — 85025 COMPLETE CBC W/AUTO DIFF WBC: CPT | Performed by: INTERNAL MEDICINE

## 2022-12-09 PROCEDURE — 1170F FXNL STATUS ASSESSED: CPT | Performed by: INTERNAL MEDICINE

## 2022-12-09 PROCEDURE — 80053 COMPREHEN METABOLIC PANEL: CPT | Performed by: INTERNAL MEDICINE

## 2022-12-09 PROCEDURE — 1160F RVW MEDS BY RX/DR IN RCRD: CPT | Performed by: INTERNAL MEDICINE

## 2022-12-09 PROCEDURE — 99214 OFFICE O/P EST MOD 30 MIN: CPT | Performed by: INTERNAL MEDICINE

## 2022-12-09 PROCEDURE — 90677 PCV20 VACCINE IM: CPT | Performed by: INTERNAL MEDICINE

## 2022-12-09 RX ORDER — HYDROXYZINE HYDROCHLORIDE 25 MG/1
25 TABLET, FILM COATED ORAL EVERY 8 HOURS PRN
Qty: 60 TABLET | Refills: 2
Start: 2022-12-09

## 2022-12-09 RX ORDER — GABAPENTIN 300 MG/1
300 CAPSULE ORAL NIGHTLY
Qty: 30 CAPSULE | Refills: 1 | Status: SHIPPED | OUTPATIENT
Start: 2022-12-09 | End: 2023-04-05

## 2022-12-09 RX ORDER — FLUCONAZOLE 200 MG/1
200 TABLET ORAL DAILY
Qty: 3 TABLET | Refills: 0 | Status: SHIPPED | OUTPATIENT
Start: 2022-12-09 | End: 2023-01-30 | Stop reason: SDUPTHER

## 2022-12-09 NOTE — PROGRESS NOTES
The ABCs of the Annual Wellness Visit  Subsequent Medicare Wellness Visit    Subjective    Pramod Trent is a 72 y.o. male who presents for a Subsequent Medicare Wellness Visit.          The following portions of the patient's history were reviewed and   updated as appropriate: allergies, current medications, past family history, past medical history, past social history, past surgical history and problem list.    Compared to one year ago, the patient feels his physical   health is worse.    Compared to one year ago, the patient feels his mental   health is worse.    Recent Hospitalizations:  This patient has had a [facility] admission record on file within the last 365 days.    Current Medical Providers:  Patient Care Team:  Shanel Bautista MD as PCP - General (Internal Medicine)    Outpatient Medications Prior to Visit   Medication Sig Dispense Refill   • diclofenac (VOLTAREN) 75 MG EC tablet Take 1 tablet by mouth 2 (Two) Times a Day. 60 tablet 2   • ketoconazole (NIZORAL) 2 % shampoo Apply  topically to the appropriate area as directed 2 (Two) Times a Week. 1 each 6   • tamsulosin (FLOMAX) 0.4 MG capsule 24 hr capsule Take 1 capsule by mouth Daily. 90 capsule 1   • nystatin (MYCOSTATIN) 100,000 unit/mL suspension Swish and swallow 5 mL 4 (Four) Times a Day. 180 mL 1   • traMADol (ULTRAM) 50 MG tablet Take 1 tablet by mouth Every 12 (Twelve) Hours As Needed for Moderate Pain. 60 tablet 0   • citalopram (CeleXA) 10 MG tablet Take 1 tablet by mouth Daily. 90 tablet 1   • fluvoxaMINE maleate  MG capsule sustained-release 24 hr Take 1 tablet by mouth Daily. 30 each 6   • lactobacillus (BACID) tablet caplet      • Lactobacillus tablet Take 1 tablet by mouth Daily. 100 tablet 1   • famotidine (PEPCID) 20 MG tablet      • hydrOXYzine (ATARAX) 25 MG tablet Take 1 tablet by mouth Every 8 (Eight) Hours As Needed for Anxiety. 60 tablet 2   • hydrOXYzine (ATARAX) 25 MG tablet Take 1 tablet by mouth.     •  ondansetron ODT (ZOFRAN-ODT) 4 MG disintegrating tablet      • tiZANidine (Zanaflex) 4 MG tablet Take 1 tablet by mouth 2 (Two) Times a Day As Needed for Muscle Spasms. 45 tablet 1     No facility-administered medications prior to visit.       Opioid medication/s are on active medication list.  and I have evaluated his active treatment plan and pain score trends (see table).  There were no vitals filed for this visit.  I have reviewed the chart for potential of high risk medication and harmful drug interactions in the elderly.            Aspirin is not on active medication list.  Aspirin use is not indicated based on review of current medical condition/s. Risk of harm outweighs potential benefits.  .    Patient Active Problem List   Diagnosis   • Post-traumatic osteoarthritis of one knee, right   • DDD (degenerative disc disease), cervical   • Erectile dysfunction due to diseases classified elsewhere   • Chronic pain syndrome   • Anxiety   • Need for immunization against influenza   • Preventative health care   • Essential hypertension   • Alcohol overdose   • Sciatic nerve pain, right   • Screening for prostate cancer   • Sciatic nerve pain, left   • Functional diarrhea   • Alcoholism (HCC)   • Weight loss   • Chronic hypokalemia   • Small bowel obstruction (HCC)   • Left foot drop   • Acute diarrhea   • JOSIE (acute kidney injury) (HCC)   • Enterocolitis   • History of ileus   • Normocytic anemia   • Unintentional weight loss   • Colitis   • History of alcohol abuse   • S/P colon resection   • Medicare annual wellness visit, subsequent   • Thrush     Advance Care Planning  Advance Directive is not on file.  ACP discussion was held with the patient during this visit. Patient does not have an advance directive, declines further assistance.     Objective    Vitals:    12/09/22 1545   BP: 111/69   Pulse: 82   Resp: 16   Temp: 96.6 °F (35.9 °C)   SpO2: 100%   Weight: 70.8 kg (156 lb)   Height: 182.9 cm (72\")      Estimated body mass index is 21.16 kg/m² as calculated from the following:    Height as of this encounter: 182.9 cm (72\").    Weight as of this encounter: 70.8 kg (156 lb).    BMI is within normal parameters. No other follow-up for BMI required.      Does the patient have evidence of cognitive impairment? No    Lab Results   Component Value Date    TRIG 58 2022    HDL 43 2022    LDL 87 2022    VLDL 12 2022        HEALTH RISK ASSESSMENT    Smoking Status:  Social History     Tobacco Use   Smoking Status Former   • Packs/day: 1.00   • Years: 30.00   • Pack years: 30.00   • Types: Cigarettes   • Quit date: 2002   • Years since quittin.0   Smokeless Tobacco Former   • Types: Chew     Alcohol Consumption:  Social History     Substance and Sexual Activity   Alcohol Use Not Currently     Fall Risk Screen:    MARIA FERNANDA Fall Risk Assessment has not been completed.    Depression Screening:  PHQ-2/PHQ-9 Depression Screening 2022   Retired Total Score -   Little Interest or Pleasure in Doing Things 1-->several days   Feeling Down, Depressed or Hopeless 1-->several days   Trouble Falling or Staying Asleep, or Sleeping Too Much 0-->not at all   Feeling Tired or Having Little Energy 1-->several days   PHQ-9: Brief Depression Severity Measure Score 3       Health Habits and Functional and Cognitive Screening:  Functional & Cognitive Status 2022   Do you have difficulty preparing food and eating? No   Do you have difficulty bathing yourself, getting dressed or grooming yourself? No   Do you have difficulty using the toilet? No   Do you have difficulty moving around from place to place? No   Do you have trouble with steps or getting out of a bed or a chair? No   Current Diet Well Balanced Diet   Dental Exam Not up to date   Eye Exam Not up to date   Exercise (times per week) 5 times per week   Current Exercises Include Walking   Do you need help using the phone?  No   Are you deaf or do you  have serious difficulty hearing?  Yes   Do you need help with transportation? No   Do you need help shopping? No   Do you need help preparing meals?  No   Do you need help with housework?  No   Do you need help with laundry? No   Do you need help taking your medications? No   Do you need help managing money? No   Do you ever drive or ride in a car without wearing a seat belt? No   Have you felt unusual stress, anger or loneliness in the last month? Yes   Who do you live with? Spouse   If you need help, do you have trouble finding someone available to you? No   Do you have difficulty concentrating, remembering or making decisions? Yes       Age-appropriate Screening Schedule:  Refer to the list below for future screening recommendations based on patient's age, sex and/or medical conditions. Orders for these recommended tests are listed in the plan section. The patient has been provided with a written plan.    Health Maintenance   Topic Date Due   • ZOSTER VACCINE (1 of 2) Never done   • TDAP/TD VACCINES (2 - Td or Tdap) 03/18/2031   • INFLUENZA VACCINE  Completed                CMS Preventative Services Quick Reference  Risk Factors Identified During Encounter  Chronic Pain: Chronic Pain Educational material Discussed and Shared in After Visit Summary for Patient.  Depression/Dysphoria: Current medication is effective, no change recommended  The above risks/problems have been discussed with the patient.  Pertinent information has been shared with the patient in the After Visit Summary.  An After Visit Summary and PPPS were made available to the patient.    Follow Up:   Next Medicare Wellness visit to be scheduled in 1 year.       Additional E&M Note during same encounter follows:  Patient has multiple medical problems which are significant and separately identifiable that require additional work above and beyond the Medicare Wellness Visit.      Chief Complaint  Medicare Wellness-subsequent, Foot Pain, and abdomen  pain    Subjective        HPI  Pramod Trent is also being seen today for    Left foot drop  The patient reports his left foot drop has resolved. He states he was referred to physical therapy by his podiatrist. He notes he had an appointment with a neurologist. He states that he did not have an EMG done. He reports his toes are a little bit numb, and they hurt at night after he has been on them all day. He notes the numbness has gotten better. He admits he has been trying to figure out a way to get back to normal where he can work.  He reports he has feeling in his legs. He states he was not using a walker or a cane. He notes he has enough strength to drive himself. He admits he needs to use his Silver Sneakers and join a gym. He adds he is trying to get a job right now of some sort. . He admits he has not fallen at all. He adds he slipped one day; however, he caught himself. He notes he gets tightness in his shoulders and neck. He reports that it is probably from not working.     Weight gain  He reports he gained a little bit more weight. He states his weight at home on an electric scale is 158 pounds. He notes at night he is usually around 158 to 160 pounds. He admits he weighed 160 pounds when he got out of the high school.  He states he is only 15 pounds off. He notes he gets up in the morning and he eats Muslim oats, 2 packets every day, and 6 ounces of yogurt. He admits he has done better with the colostomy bag. He adds he is afraid to try to lift too much. He reports he has lifted 35 to 40 pounds; however, he can not lift 50 to 70 pounds yet.    Anxiety  He reports he is not on any kind of antidepressant or anxiety medication anymore. He states he has taken the hydroxyzine 3 times a day. He notes he gets more depressed in the winter because he is a \"light\" person. He admits he has too much time on his hands. He adds he would like to get into some occupational therapy.    Medications  He reports that he is  using all of his tramadol. He reports that he is taking 2 times daily. He states it helps somewhat. He notes his therapist told him to try the gabapentin. He reports that he has not been on gabapentin before. He reports he does not have many headaches. He reports that his blood pressure medication is out.     Stomach  He reports his stomach is working well. He states he has not had any issues lately.  He notes the adhesive around his colostomy bag is not breaking down his skin too bad. He admits he has little bumps in there; however, not much. He adds he empties it when he eats, 2 to 3 times a day. He reports that it does not have much air in it. He reports that he gets up once or twice in the middle of the night to urinate; however, not at all some nights.     Thrush  He reports that he got thrush approximately 3 weeks ago, and still complains of having it. He states he was not given an antibiotic; however was given nystatin mouth wash. He notes his dentures fit pretty well. He admits to wearing them all day. He admits they never  fit exactly right. He adds he did not wear them for the first month; however, he then tried them one day, and they were okay. He reports that he is going to try to get some teeth fixed.        Objective   Vital Signs:  /69   Pulse 82   Temp 96.6 °F (35.9 °C)   Resp 16   Ht 182.9 cm (72\")   Wt 70.8 kg (156 lb)   SpO2 100%   BMI 21.16 kg/m²     Physical Exam  Vitals and nursing note reviewed.   Constitutional:       Appearance: Normal appearance. He is well-developed.   HENT:      Head: Normocephalic and atraumatic.      Right Ear: Tympanic membrane normal.      Left Ear: Tympanic membrane normal.      Nose: No rhinorrhea.      Mouth/Throat:      Pharynx: No posterior oropharyngeal erythema.   Eyes:      Pupils: Pupils are equal, round, and reactive to light.   Cardiovascular:      Rate and Rhythm: Normal rate and regular rhythm.      Pulses: Normal pulses.      Heart sounds:  Normal heart sounds. No murmur heard.  Pulmonary:      Effort: Pulmonary effort is normal.      Breath sounds: Normal breath sounds.   Abdominal:      General: The ostomy site is clean. Bowel sounds are normal. There is no distension.      Palpations: Abdomen is soft.   Musculoskeletal:         General: No tenderness.      Cervical back: Normal range of motion and neck supple.   Skin:     Capillary Refill: Capillary refill takes less than 2 seconds.   Neurological:      Mental Status: He is alert and oriented to person, place, and time.      Comments: Clawed 1st and 2nd toes bilaterally   Psychiatric:         Mood and Affect: Mood normal.         Behavior: Behavior normal.          The following data was reviewed by: Shanel Bautista MD on 12/09/2022:  CMP    CMP 7/3/22 7/3/22 7/19/22 12/9/22    0621 1254     Glucose 102 (A) 87  94   BUN 12 12  24 (A)   Creatinine 0.33 (A) 0.37 (A)  0.71 (A)   eGFR 123.6 119.4  98.1   Sodium 133 (A) 133 (A)  138   Potassium 3.1 (A) 3.5 5.8 (A) 4.1   Chloride 97 (A) 100  102   Calcium 8.3 (A) 8.1 (A)  9.7   Total Protein    7.3   Albumin    4.00   Globulin    3.3   Total Bilirubin    0.2   Alkaline Phosphatase    132 (A)   AST (SGOT)    18   ALT (SGPT)    11   Albumin/Globulin Ratio    1.2   BUN/Creatinine Ratio 36.4 (A) 32.4 (A)  33.8 (A)   Anion Gap 13.0 12.0  6.0   (A) Abnormal value       Comments are available for some flowsheets but are not being displayed.           CBC    CBC 7/4/22 7/19/22 12/9/22   WBC 10.60  7.48   RBC 3.63 (A)  4.62   Hemoglobin 9.1 (A) 7.2 (A) 13.0   Hematocrit 31.2 (A) 25.2 (A) 39.0   MCV 86.0  84.4   MCH 25.1 (A)  28.1   MCHC 29.2 (A)  33.3   RDW 15.6  12.9   Platelets 496 (A)  358   (A) Abnormal value       Comments are available for some flowsheets but are not being displayed.           Lipid Panel    Lipid Panel 7/25/22 8/1/22 12/9/22   Total Cholesterol   142   Triglycerides 40 67 58   HDL Cholesterol   43   VLDL Cholesterol   12   LDL  Cholesterol    87   LDL/HDL Ratio   2.03      Comments are available for some flowsheets but are not being displayed.           TSH    TSH 12/9/22   TSH 2.620           Data reviewed: Radiologic studies CT           Assessment and Plan   Diagnoses and all orders for this visit:    1. Thrush (Primary)  Comments:  diflucan as nystatin did not help    2. Unintentional weight loss    3. Essential hypertension    4. Chronic pain syndrome    5. Preventative health care    6. Screening for prostate cancer  -     PSA Screen    7. Medicare annual wellness visit, subsequent  -     Comprehensive Metabolic Panel  -     Lipid Panel  -     CBC Auto Differential  -     TSH    Other orders  -     hydrOXYzine (ATARAX) 25 MG tablet; Take 1 tablet by mouth Every 8 (Eight) Hours As Needed for Anxiety.  Dispense: 60 tablet; Refill: 2  -     gabapentin (NEURONTIN) 300 MG capsule; Take 1 capsule by mouth Every Night.  Dispense: 30 capsule; Refill: 1  -     fluconazole (Diflucan) 200 MG tablet; Take 1 tablet by mouth Daily.  Dispense: 3 tablet; Refill: 0  -     Pneumococcal Conjugate Vaccine 20-Valent (PCV20)    Left foot drop  He will continue to follow up with his podiatrist.    Anxiety  He will continue to take hydroxyzine as needed.    Hypertension  He will continue to take amlodipine as prescribed.    Health maintenance  He will receive his pneumonia vaccine today.       I spent 38 minutes caring for Pramod on this date of service. This time includes time spent by me in the following activities:reviewing tests, performing a medically appropriate examination and/or evaluation , counseling and educating the patient/family/caregiver and ordering medications, tests, or procedures  Follow Up   Return in about 6 months (around 6/9/2023), or if symptoms worsen or fail to improve.  Patient was given instructions and counseling regarding his condition or for health maintenance advice. Please see specific information pulled into the AVS if  appropriate.     Transcribed from ambient dictation for Shanel Bautista MD by Malathi Garza.  12/09/22   17:47 EST    Patient or patient representative verbalized consent to the visit recording.  I have personally performed the services described in this document as transcribed by the above individual, and it is both accurate and complete.  Shanel Bautista MD  1/1/2023  21:40 EST

## 2022-12-10 LAB
ALBUMIN SERPL-MCNC: 4 G/DL (ref 3.5–5.2)
ALBUMIN/GLOB SERPL: 1.2 G/DL
ALP SERPL-CCNC: 132 U/L (ref 39–117)
ALT SERPL W P-5'-P-CCNC: 11 U/L (ref 1–41)
ANION GAP SERPL CALCULATED.3IONS-SCNC: 6 MMOL/L (ref 5–15)
AST SERPL-CCNC: 18 U/L (ref 1–40)
BASOPHILS # BLD AUTO: 0.02 10*3/MM3 (ref 0–0.2)
BASOPHILS NFR BLD AUTO: 0.3 % (ref 0–1.5)
BILIRUB SERPL-MCNC: 0.2 MG/DL (ref 0–1.2)
BUN SERPL-MCNC: 24 MG/DL (ref 8–23)
BUN/CREAT SERPL: 33.8 (ref 7–25)
CALCIUM SPEC-SCNC: 9.7 MG/DL (ref 8.6–10.5)
CHLORIDE SERPL-SCNC: 102 MMOL/L (ref 98–107)
CHOLEST SERPL-MCNC: 142 MG/DL (ref 0–200)
CO2 SERPL-SCNC: 30 MMOL/L (ref 22–29)
CREAT SERPL-MCNC: 0.71 MG/DL (ref 0.76–1.27)
DEPRECATED RDW RBC AUTO: 40 FL (ref 37–54)
EGFRCR SERPLBLD CKD-EPI 2021: 98.1 ML/MIN/1.73
EOSINOPHIL # BLD AUTO: 0.12 10*3/MM3 (ref 0–0.4)
EOSINOPHIL NFR BLD AUTO: 1.6 % (ref 0.3–6.2)
ERYTHROCYTE [DISTWIDTH] IN BLOOD BY AUTOMATED COUNT: 12.9 % (ref 12.3–15.4)
GLOBULIN UR ELPH-MCNC: 3.3 GM/DL
GLUCOSE SERPL-MCNC: 94 MG/DL (ref 65–99)
HCT VFR BLD AUTO: 39 % (ref 37.5–51)
HDLC SERPL-MCNC: 43 MG/DL (ref 40–60)
HGB BLD-MCNC: 13 G/DL (ref 13–17.7)
IMM GRANULOCYTES # BLD AUTO: 0.02 10*3/MM3 (ref 0–0.05)
IMM GRANULOCYTES NFR BLD AUTO: 0.3 % (ref 0–0.5)
LDLC SERPL CALC-MCNC: 87 MG/DL (ref 0–100)
LDLC/HDLC SERPL: 2.03 {RATIO}
LYMPHOCYTES # BLD AUTO: 1.83 10*3/MM3 (ref 0.7–3.1)
LYMPHOCYTES NFR BLD AUTO: 24.5 % (ref 19.6–45.3)
MCH RBC QN AUTO: 28.1 PG (ref 26.6–33)
MCHC RBC AUTO-ENTMCNC: 33.3 G/DL (ref 31.5–35.7)
MCV RBC AUTO: 84.4 FL (ref 79–97)
MONOCYTES # BLD AUTO: 0.86 10*3/MM3 (ref 0.1–0.9)
MONOCYTES NFR BLD AUTO: 11.5 % (ref 5–12)
NEUTROPHILS NFR BLD AUTO: 4.63 10*3/MM3 (ref 1.7–7)
NEUTROPHILS NFR BLD AUTO: 61.8 % (ref 42.7–76)
NRBC BLD AUTO-RTO: 0 /100 WBC (ref 0–0.2)
PLATELET # BLD AUTO: 358 10*3/MM3 (ref 140–450)
PMV BLD AUTO: 9 FL (ref 6–12)
POTASSIUM SERPL-SCNC: 4.1 MMOL/L (ref 3.5–5.2)
PROT SERPL-MCNC: 7.3 G/DL (ref 6–8.5)
PSA SERPL-MCNC: 2.7 NG/ML (ref 0–4)
RBC # BLD AUTO: 4.62 10*6/MM3 (ref 4.14–5.8)
SODIUM SERPL-SCNC: 138 MMOL/L (ref 136–145)
TRIGL SERPL-MCNC: 58 MG/DL (ref 0–150)
TSH SERPL DL<=0.05 MIU/L-ACNC: 2.62 UIU/ML (ref 0.27–4.2)
VLDLC SERPL-MCNC: 12 MG/DL (ref 5–40)
WBC NRBC COR # BLD: 7.48 10*3/MM3 (ref 3.4–10.8)

## 2022-12-12 DIAGNOSIS — Z90.49 S/P COLON RESECTION: Chronic | ICD-10-CM

## 2022-12-13 RX ORDER — TRAMADOL HYDROCHLORIDE 50 MG/1
50 TABLET ORAL EVERY 12 HOURS PRN
Qty: 60 TABLET | Refills: 2 | Status: SHIPPED | OUTPATIENT
Start: 2022-12-13 | End: 2023-03-15

## 2023-01-03 RX ORDER — DICLOFENAC SODIUM 75 MG/1
TABLET, DELAYED RELEASE ORAL
Qty: 60 TABLET | Refills: 0 | Status: SHIPPED | OUTPATIENT
Start: 2023-01-03 | End: 2023-02-07

## 2023-01-18 ENCOUNTER — TELEPHONE (OUTPATIENT)
Dept: FAMILY MEDICINE CLINIC | Facility: CLINIC | Age: 73
End: 2023-01-18
Payer: MEDICARE

## 2023-01-18 NOTE — TELEPHONE ENCOUNTER
PT DROPPED OFF A DOT MEDICAL LETTER THAT HE IS NEEDING TO HAVE FILLED OUT REGARDING WHY HE HAS TO TAKE TRAMADOL. HE SAID THAT HE WILL COME TO PICK THE FORM UP, SO PLEASE CALL HIM -[phone] WHEN  PAPERS ARE READY. I DID SEE THAT PATIENT HAD A MEDICARE WELLNESS ON 12/09/22. PLEASE ADVISE. GAVE PAPERS TO LUIS MIGUEL

## 2023-01-18 NOTE — TELEPHONE ENCOUNTER
Caller: Pramod Trent    Relationship: Self    Best call back number: [phone]    What form or medical record are you requesting: ABOUT THE TRAMODAL     Who is requesting this form or medical record from you: DOT     How would you like to receive the form or medical records (pick-up, mail, fax): PICKUP     Timeframe paperwork needed: AS QUICKLY AS POSSIBLE BUT NO MORE THAN 30 DAYS. WOULD REALLY LIKE TO GET IN BY TOMORROW PATIENT WILL BRING THE PAPERWORK

## 2023-01-23 NOTE — TELEPHONE ENCOUNTER
Please let him know that paperwork turn around is 5-7 business days, additionally Dr. Bautista is out with covid, but has these forms with her. They should be ready to  hopefully at the end of the week. We will let him know once they are ready.

## 2023-01-24 NOTE — TELEPHONE ENCOUNTER
HUB TO SHARE: LEFT VM. PLEASE GIVE FOLLOWING MESSAGE. Please let him know that paperwork turn around is 5-7 business days, additionally Dr. Bautista is out with covid, but has these forms with her. They should be ready to  hopefully at the end of the week. We will let him know once they are ready.

## 2023-01-25 ENCOUNTER — TELEPHONE (OUTPATIENT)
Dept: FAMILY MEDICINE CLINIC | Facility: CLINIC | Age: 73
End: 2023-01-25
Payer: MEDICARE

## 2023-01-25 NOTE — TELEPHONE ENCOUNTER
Caller: Pramod Trent     Relationship: [unfilled]     Best call back number: [phone]    What is your medical concern? PATIENT WOULD LIKE  HIS BLANK DOT PAPERWORK. STATES HE HAD DROPPED IT OFF PREVIOUSLY TO FILL OUT BUT DR DRIVER IS NOT ABLE TO DO THIS. PLEASE LEAVE THIS WITH  SO HE CAN PICK THIS UP TOMORROW AM.

## 2023-01-25 NOTE — TELEPHONE ENCOUNTER
"HUB TO SHARE  \"Per Dr. Bautista, she no longer does DOT forms, he needs to check with his employer to see where to go to have these completed. Detailed voicemail left for him with this information.\"  "

## 2023-01-25 NOTE — TELEPHONE ENCOUNTER
Gave message to patient at 2:13pm when he called back in.  Patient said it is not really a DOT form.  It is a form with 10 questions that has to be filled out by his medical doctor (CONY) on why he is taking Tramadol.  CMM has prescribed him the Tramadol for the past 5-6 years for arthritis and is the one to complete the form.  Patient states it is not a DOT form but a medical form.  Please call patient with an answer to this message.  Patient doesn't understand what the problem is.  Said he needs it filled out to start work on Monday 1/30/2023.

## 2023-01-25 NOTE — TELEPHONE ENCOUNTER
"Please let him know the form has \"DOT\" in the title on the front page. 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 I do not have this form scanned in his chart from previous times. If he needs it completed by the 30th, I recommend going to occupational health.  "

## 2023-01-30 RX ORDER — FLUCONAZOLE 200 MG/1
200 TABLET ORAL DAILY
Qty: 3 TABLET | Refills: 0 | Status: SHIPPED | OUTPATIENT
Start: 2023-01-30 | End: 2023-04-05

## 2023-01-31 ENCOUNTER — OFFICE VISIT (OUTPATIENT)
Dept: FAMILY MEDICINE CLINIC | Facility: CLINIC | Age: 73
End: 2023-01-31
Payer: MEDICARE

## 2023-01-31 ENCOUNTER — LAB (OUTPATIENT)
Dept: FAMILY MEDICINE CLINIC | Facility: CLINIC | Age: 73
End: 2023-01-31
Payer: MEDICARE

## 2023-01-31 VITALS
SYSTOLIC BLOOD PRESSURE: 108 MMHG | OXYGEN SATURATION: 98 % | DIASTOLIC BLOOD PRESSURE: 60 MMHG | BODY MASS INDEX: 20.99 KG/M2 | RESPIRATION RATE: 16 BRPM | HEIGHT: 72 IN | HEART RATE: 110 BPM | WEIGHT: 155 LBS

## 2023-01-31 DIAGNOSIS — B37.0 THRUSH: ICD-10-CM

## 2023-01-31 DIAGNOSIS — R19.7 DIARRHEA, UNSPECIFIED TYPE: Primary | ICD-10-CM

## 2023-01-31 DIAGNOSIS — R63.4 WEIGHT LOSS: ICD-10-CM

## 2023-01-31 PROCEDURE — 36415 COLL VENOUS BLD VENIPUNCTURE: CPT | Performed by: INTERNAL MEDICINE

## 2023-01-31 PROCEDURE — 86140 C-REACTIVE PROTEIN: CPT | Performed by: INTERNAL MEDICINE

## 2023-01-31 PROCEDURE — 80053 COMPREHEN METABOLIC PANEL: CPT | Performed by: INTERNAL MEDICINE

## 2023-01-31 PROCEDURE — 99214 OFFICE O/P EST MOD 30 MIN: CPT | Performed by: INTERNAL MEDICINE

## 2023-01-31 PROCEDURE — 85025 COMPLETE CBC W/AUTO DIFF WBC: CPT | Performed by: INTERNAL MEDICINE

## 2023-01-31 RX ORDER — DIPHENOXYLATE HYDROCHLORIDE AND ATROPINE SULFATE 2.5; .025 MG/1; MG/1
1 TABLET ORAL 4 TIMES DAILY PRN
Qty: 30 TABLET | Refills: 0 | Status: SHIPPED | OUTPATIENT
Start: 2023-01-31 | End: 2023-02-10 | Stop reason: SDUPTHER

## 2023-02-01 LAB
ALBUMIN SERPL-MCNC: 3.7 G/DL (ref 3.5–5.2)
ALBUMIN/GLOB SERPL: 1.2 G/DL
ALP SERPL-CCNC: 108 U/L (ref 39–117)
ALT SERPL W P-5'-P-CCNC: <5 U/L (ref 1–41)
ANION GAP SERPL CALCULATED.3IONS-SCNC: 8 MMOL/L (ref 5–15)
AST SERPL-CCNC: 10 U/L (ref 1–40)
BASOPHILS # BLD AUTO: 0.02 10*3/MM3 (ref 0–0.2)
BASOPHILS NFR BLD AUTO: 0.3 % (ref 0–1.5)
BILIRUB SERPL-MCNC: 0.2 MG/DL (ref 0–1.2)
BUN SERPL-MCNC: 17 MG/DL (ref 8–23)
BUN/CREAT SERPL: 23.3 (ref 7–25)
CALCIUM SPEC-SCNC: 9.1 MG/DL (ref 8.6–10.5)
CHLORIDE SERPL-SCNC: 100 MMOL/L (ref 98–107)
CO2 SERPL-SCNC: 28 MMOL/L (ref 22–29)
CREAT SERPL-MCNC: 0.73 MG/DL (ref 0.76–1.27)
CRP SERPL-MCNC: 11.22 MG/DL (ref 0–0.5)
DEPRECATED RDW RBC AUTO: 39.4 FL (ref 37–54)
EGFRCR SERPLBLD CKD-EPI 2021: 97.3 ML/MIN/1.73
EOSINOPHIL # BLD AUTO: 0.09 10*3/MM3 (ref 0–0.4)
EOSINOPHIL NFR BLD AUTO: 1.4 % (ref 0.3–6.2)
ERYTHROCYTE [DISTWIDTH] IN BLOOD BY AUTOMATED COUNT: 12.7 % (ref 12.3–15.4)
GLOBULIN UR ELPH-MCNC: 3.1 GM/DL
GLUCOSE SERPL-MCNC: 98 MG/DL (ref 65–99)
HCT VFR BLD AUTO: 34.8 % (ref 37.5–51)
HGB BLD-MCNC: 11.6 G/DL (ref 13–17.7)
IMM GRANULOCYTES # BLD AUTO: 0.05 10*3/MM3 (ref 0–0.05)
IMM GRANULOCYTES NFR BLD AUTO: 0.8 % (ref 0–0.5)
LYMPHOCYTES # BLD AUTO: 1.14 10*3/MM3 (ref 0.7–3.1)
LYMPHOCYTES NFR BLD AUTO: 17.2 % (ref 19.6–45.3)
MCH RBC QN AUTO: 28.6 PG (ref 26.6–33)
MCHC RBC AUTO-ENTMCNC: 33.3 G/DL (ref 31.5–35.7)
MCV RBC AUTO: 85.9 FL (ref 79–97)
MONOCYTES # BLD AUTO: 1.11 10*3/MM3 (ref 0.1–0.9)
MONOCYTES NFR BLD AUTO: 16.7 % (ref 5–12)
NEUTROPHILS NFR BLD AUTO: 4.23 10*3/MM3 (ref 1.7–7)
NEUTROPHILS NFR BLD AUTO: 63.6 % (ref 42.7–76)
NRBC BLD AUTO-RTO: 0 /100 WBC (ref 0–0.2)
PLATELET # BLD AUTO: 448 10*3/MM3 (ref 140–450)
PMV BLD AUTO: 8.7 FL (ref 6–12)
POTASSIUM SERPL-SCNC: 4 MMOL/L (ref 3.5–5.2)
PROT SERPL-MCNC: 6.8 G/DL (ref 6–8.5)
RBC # BLD AUTO: 4.05 10*6/MM3 (ref 4.14–5.8)
SODIUM SERPL-SCNC: 136 MMOL/L (ref 136–145)
WBC NRBC COR # BLD: 6.64 10*3/MM3 (ref 3.4–10.8)

## 2023-02-02 ENCOUNTER — LAB (OUTPATIENT)
Dept: LAB | Facility: HOSPITAL | Age: 73
End: 2023-02-02
Payer: MEDICARE

## 2023-02-02 ENCOUNTER — TELEPHONE (OUTPATIENT)
Dept: FAMILY MEDICINE CLINIC | Facility: CLINIC | Age: 73
End: 2023-02-02
Payer: MEDICARE

## 2023-02-02 DIAGNOSIS — A09 DIARRHEA OF INFECTIOUS ORIGIN: ICD-10-CM

## 2023-02-02 DIAGNOSIS — A09 DIARRHEA OF INFECTIOUS ORIGIN: Primary | ICD-10-CM

## 2023-02-02 DIAGNOSIS — K51.519 LEFT SIDED COLITIS WITH UNSPECIFIED COMPLICATIONS: ICD-10-CM

## 2023-02-02 LAB

## 2023-02-02 PROCEDURE — 87507 IADNA-DNA/RNA PROBE TQ 12-25: CPT

## 2023-02-02 NOTE — TELEPHONE ENCOUNTER
Caller: JOLLY CABRALES    Relationship: Emergency Contact    Best call back number: [phone]    What is the best time to reach you: ANY    Who are you requesting to speak with (clinical staff, provider,  specific staff member): CLINICAL    What was the call regarding:   PATIENT IS STILL GOING 5 TIMES A DAY AND STOOL IS STILL BLACK. WOULD LIKE TO PROCEED WITH DOING THE CULTURE.     Do you require a callback: YES

## 2023-02-06 ENCOUNTER — APPOINTMENT (OUTPATIENT)
Dept: CT IMAGING | Facility: HOSPITAL | Age: 73
End: 2023-02-06
Payer: MEDICARE

## 2023-02-06 ENCOUNTER — HOSPITAL ENCOUNTER (EMERGENCY)
Facility: HOSPITAL | Age: 73
Discharge: HOME | End: 2023-02-06
Attending: EMERGENCY MEDICINE | Admitting: EMERGENCY MEDICINE
Payer: MEDICARE

## 2023-02-06 ENCOUNTER — TELEPHONE (OUTPATIENT)
Dept: FAMILY MEDICINE CLINIC | Facility: CLINIC | Age: 73
End: 2023-02-06

## 2023-02-06 VITALS
RESPIRATION RATE: 14 BRPM | HEIGHT: 72 IN | WEIGHT: 152.56 LBS | SYSTOLIC BLOOD PRESSURE: 118 MMHG | HEART RATE: 88 BPM | TEMPERATURE: 98.9 F | OXYGEN SATURATION: 96 % | BODY MASS INDEX: 20.66 KG/M2 | DIASTOLIC BLOOD PRESSURE: 61 MMHG

## 2023-02-06 DIAGNOSIS — R19.7 DIARRHEA, UNSPECIFIED TYPE: Primary | ICD-10-CM

## 2023-02-06 DIAGNOSIS — R19.7 DIARRHEA, UNSPECIFIED TYPE: ICD-10-CM

## 2023-02-06 DIAGNOSIS — R10.9 PAIN IN ABDOMEN ON PALPATION: Primary | ICD-10-CM

## 2023-02-06 LAB
ALBUMIN SERPL-MCNC: 3.2 G/DL (ref 3.5–5.2)
ALBUMIN/GLOB SERPL: 0.8 G/DL
ALP SERPL-CCNC: 127 U/L (ref 39–117)
ALT SERPL W P-5'-P-CCNC: 11 U/L (ref 1–41)
ANION GAP SERPL CALCULATED.3IONS-SCNC: 10 MMOL/L (ref 5–15)
AST SERPL-CCNC: 16 U/L (ref 1–40)
BASOPHILS # BLD AUTO: 0 10*3/MM3 (ref 0–0.2)
BASOPHILS NFR BLD AUTO: 0.3 % (ref 0–1.5)
BILIRUB SERPL-MCNC: 0.2 MG/DL (ref 0–1.2)
BILIRUB UR QL STRIP: NEGATIVE
BUN SERPL-MCNC: 14 MG/DL (ref 8–23)
BUN/CREAT SERPL: 20.9 (ref 7–25)
CALCIUM SPEC-SCNC: 9.2 MG/DL (ref 8.6–10.5)
CHLORIDE SERPL-SCNC: 97 MMOL/L (ref 98–107)
CLARITY UR: CLEAR
CO2 SERPL-SCNC: 27 MMOL/L (ref 22–29)
COLOR UR: YELLOW
CREAT SERPL-MCNC: 0.67 MG/DL (ref 0.76–1.27)
DEPRECATED RDW RBC AUTO: 43.8 FL (ref 37–54)
EGFRCR SERPLBLD CKD-EPI 2021: 99.8 ML/MIN/1.73
EOSINOPHIL # BLD AUTO: 0.1 10*3/MM3 (ref 0–0.4)
EOSINOPHIL NFR BLD AUTO: 0.5 % (ref 0.3–6.2)
ERYTHROCYTE [DISTWIDTH] IN BLOOD BY AUTOMATED COUNT: 13.9 % (ref 12.3–15.4)
GLOBULIN UR ELPH-MCNC: 3.9 GM/DL
GLUCOSE SERPL-MCNC: 106 MG/DL (ref 65–99)
GLUCOSE UR STRIP-MCNC: NEGATIVE MG/DL
HCT VFR BLD AUTO: 36.1 % (ref 37.5–51)
HGB BLD-MCNC: 12.5 G/DL (ref 13–17.7)
HGB UR QL STRIP.AUTO: NEGATIVE
KETONES UR QL STRIP: ABNORMAL
LEUKOCYTE ESTERASE UR QL STRIP.AUTO: NEGATIVE
LIPASE SERPL-CCNC: 10 U/L (ref 13–60)
LYMPHOCYTES # BLD AUTO: 0.8 10*3/MM3 (ref 0.7–3.1)
LYMPHOCYTES NFR BLD AUTO: 7.5 % (ref 19.6–45.3)
MCH RBC QN AUTO: 29.1 PG (ref 26.6–33)
MCHC RBC AUTO-ENTMCNC: 34.7 G/DL (ref 31.5–35.7)
MCV RBC AUTO: 84 FL (ref 79–97)
MONOCYTES # BLD AUTO: 1.2 10*3/MM3 (ref 0.1–0.9)
MONOCYTES NFR BLD AUTO: 11.5 % (ref 5–12)
NEUTROPHILS NFR BLD AUTO: 8.6 10*3/MM3 (ref 1.7–7)
NEUTROPHILS NFR BLD AUTO: 80.2 % (ref 42.7–76)
NITRITE UR QL STRIP: NEGATIVE
NRBC BLD AUTO-RTO: 0.2 /100 WBC (ref 0–0.2)
PH UR STRIP.AUTO: 5.5 [PH] (ref 5–8)
PLATELET # BLD AUTO: 605 10*3/MM3 (ref 140–450)
PMV BLD AUTO: 6 FL (ref 6–12)
POTASSIUM SERPL-SCNC: 4.2 MMOL/L (ref 3.5–5.2)
PROT SERPL-MCNC: 7.1 G/DL (ref 6–8.5)
PROT UR QL STRIP: NEGATIVE
RBC # BLD AUTO: 4.3 10*6/MM3 (ref 4.14–5.8)
SODIUM SERPL-SCNC: 134 MMOL/L (ref 136–145)
SP GR UR STRIP: 1.02 (ref 1–1.03)
UROBILINOGEN UR QL STRIP: ABNORMAL
WBC NRBC COR # BLD: 10.7 10*3/MM3 (ref 3.4–10.8)

## 2023-02-06 PROCEDURE — 83690 ASSAY OF LIPASE: CPT

## 2023-02-06 PROCEDURE — 0 IOPAMIDOL PER 1 ML

## 2023-02-06 PROCEDURE — 85025 COMPLETE CBC W/AUTO DIFF WBC: CPT

## 2023-02-06 PROCEDURE — 80053 COMPREHEN METABOLIC PANEL: CPT

## 2023-02-06 PROCEDURE — 81003 URINALYSIS AUTO W/O SCOPE: CPT

## 2023-02-06 PROCEDURE — 99283 EMERGENCY DEPT VISIT LOW MDM: CPT

## 2023-02-06 PROCEDURE — 74177 CT ABD & PELVIS W/CONTRAST: CPT

## 2023-02-06 RX ORDER — SODIUM CHLORIDE 0.9 % (FLUSH) 0.9 %
10 SYRINGE (ML) INJECTION AS NEEDED
Status: DISCONTINUED | OUTPATIENT
Start: 2023-02-06 | End: 2023-02-07 | Stop reason: HOSPADM

## 2023-02-06 RX ADMIN — SODIUM CHLORIDE 500 ML: 9 INJECTION, SOLUTION INTRAVENOUS at 22:33

## 2023-02-06 RX ADMIN — IOPAMIDOL 100 ML: 755 INJECTION, SOLUTION INTRAVENOUS at 21:22

## 2023-02-06 NOTE — TELEPHONE ENCOUNTER
I spoke with Rylee, he has been taking the lomotil 2-3 times a day. She stated he is getting very weak, she stated that she is probably going to take him to the ER for evaluation and fluids.

## 2023-02-06 NOTE — TELEPHONE ENCOUNTER
PATIENT'S WIFE CALLED STATING THAT HE STILL HAS THE FOLLOWING SYMPTOMS:    DIARRHEA / DEHYDRATION / FEVER / WEIGHT LOSS - 7 LBS    SHE WOULD LIKE TO KNOW IF HE NEEDS TO HAVE AN MRI TO FIGURE OUT WHAT THE CAUSE OF THE SYMPTOMS ARE.    PLEASE ADVISE  [phone]

## 2023-02-07 RX ORDER — DICLOFENAC SODIUM 75 MG/1
TABLET, DELAYED RELEASE ORAL
Qty: 60 TABLET | Refills: 0 | Status: SHIPPED | OUTPATIENT
Start: 2023-02-07 | End: 2023-02-10

## 2023-02-07 NOTE — ED PROVIDER NOTES
Subjective   History of Present Illness  Patient is a 72-year-old male complaining of 1 week history of liquidy stools in his colostomy bag.  He states he is emptying the bag approximately 3-4 times per day which is his usual usual.  He denies blood or mucus in the stool.        Review of Systems  Negative for abdominal pain fever vomiting  dysuria or other complaint  Past Medical History:   Diagnosis Date   • Anxiety 10/24/2019   • Chronic pain syndrome 10/24/2019   • DDD (degenerative disc disease), cervical 10/24/2019   • Erectile dysfunction due to diseases classified elsewhere 10/24/2019   • Foot drop, left foot 2022   • Post-traumatic osteoarthritis of one knee, right 2018    Overview:  Added automatically from request for surgery [id number]       Allergies   Allergen Reactions   • Morphine Hives     RASH,FEVER   • Penicillins Rash     RASH,FEVER       Past Surgical History:   Procedure Laterality Date   • REPLACEMENT TOTAL KNEE Right    • TONSILLECTOMY         Family History   Problem Relation Age of Onset   • Hypertension Mother        Social History     Socioeconomic History   • Marital status:    Tobacco Use   • Smoking status: Former     Packs/day: 1.00     Years: 30.00     Pack years: 30.00     Types: Cigarettes     Quit date: 2002     Years since quittin.1   • Smokeless tobacco: Former     Types: Chew   Vaping Use   • Vaping Use: Never used   Substance and Sexual Activity   • Alcohol use: Not Currently   • Drug use: Never   • Sexual activity: Not Currently     Partners: Female           Objective   Physical Exam  Lungs are clear.  Heart has rate rhythm.  Chest nontender.  Ab soft with no tenderness.  Patient has normal bowel sounds without rebound or guarding.  Back has no tenderness.  Procedures           ED Course      Results for orders placed or performed during the hospital encounter of 23   Comprehensive Metabolic Panel    Specimen: Blood   Result Value Ref Range     Glucose 106 (H) 65 - 99 mg/dL    BUN 14 8 - 23 mg/dL    Creatinine 0.67 (L) 0.76 - 1.27 mg/dL    Sodium 134 (L) 136 - 145 mmol/L    Potassium 4.2 3.5 - 5.2 mmol/L    Chloride 97 (L) 98 - 107 mmol/L    CO2 27.0 22.0 - 29.0 mmol/L    Calcium 9.2 8.6 - 10.5 mg/dL    Total Protein 7.1 6.0 - 8.5 g/dL    Albumin 3.2 (L) 3.5 - 5.2 g/dL    ALT (SGPT) 11 1 - 41 U/L    AST (SGOT) 16 1 - 40 U/L    Alkaline Phosphatase 127 (H) 39 - 117 U/L    Total Bilirubin 0.2 0.0 - 1.2 mg/dL    Globulin 3.9 gm/dL    A/G Ratio 0.8 g/dL    BUN/Creatinine Ratio 20.9 7.0 - 25.0    Anion Gap 10.0 5.0 - 15.0 mmol/L    eGFR 99.8 >60.0 mL/min/1.73   Lipase    Specimen: Blood   Result Value Ref Range    Lipase 10 (L) 13 - 60 U/L   Urinalysis With Microscopic If Indicated (No Culture) - Urine, Clean Catch    Specimen: Urine, Clean Catch   Result Value Ref Range    Color, UA Yellow Yellow, Straw    Appearance, UA Clear Clear    pH, UA 5.5 5.0 - 8.0    Specific Gravity, UA 1.018 1.005 - 1.030    Glucose, UA Negative Negative    Ketones, UA Trace (A) Negative    Bilirubin, UA Negative Negative    Blood, UA Negative Negative    Protein, UA Negative Negative    Leuk Esterase, UA Negative Negative    Nitrite, UA Negative Negative    Urobilinogen, UA 1.0 E.U./dL 0.2 - 1.0 E.U./dL   CBC Auto Differential    Specimen: Blood   Result Value Ref Range    WBC 10.70 3.40 - 10.80 10*3/mm3    RBC 4.30 4.14 - 5.80 10*6/mm3    Hemoglobin 12.5 (L) 13.0 - 17.7 g/dL    Hematocrit 36.1 (L) 37.5 - 51.0 %    MCV 84.0 79.0 - 97.0 fL    MCH 29.1 26.6 - 33.0 pg    MCHC 34.7 31.5 - 35.7 g/dL    RDW 13.9 12.3 - 15.4 %    RDW-SD 43.8 37.0 - 54.0 fl    MPV 6.0 6.0 - 12.0 fL    Platelets 605 (H) 140 - 450 10*3/mm3    Neutrophil % 80.2 (H) 42.7 - 76.0 %    Lymphocyte % 7.5 (L) 19.6 - 45.3 %    Monocyte % 11.5 5.0 - 12.0 %    Eosinophil % 0.5 0.3 - 6.2 %    Basophil % 0.3 0.0 - 1.5 %    Neutrophils, Absolute 8.60 (H) 1.70 - 7.00 10*3/mm3    Lymphocytes, Absolute 0.80 0.70 - 3.10  10*3/mm3    Monocytes, Absolute 1.20 (H) 0.10 - 0.90 10*3/mm3    Eosinophils, Absolute 0.10 0.00 - 0.40 10*3/mm3    Basophils, Absolute 0.00 0.00 - 0.20 10*3/mm3    nRBC 0.2 0.0 - 0.2 /100 WBC     CT Abdomen Pelvis With Contrast    Result Date: 2/6/2023  1. Patient is status post partial colectomy with left lower quadrant ostomy noted. The residual colon demonstrates diffuse wall thickening and prominent enhancement compatible with an inflammatory or infectious enteritis. Probable involvement of the distal small bowel also noted. 2. Prominent enhancement of the stomach. Consider gastritis.. 3. No evidence of obstruction 4. Diffuse wall thickening of the urinary bladder is accentuated by incomplete distention. Underlying cystitis is not excluded. Please correlate with urinalysis Electronically Signed: Dominic Fabian  2/6/2023 9:36 PM EST  Workstation ID: [id number]                                         Medical Decision Making  Patient has mild wall thickening in his colon on CT scan of his abdomen and pelvis where the patient has no evidence of acute infectious process with no leukocytosis.  Metabolic panel is at baseline without renal insufficiency or electrolyte abnormality.  Patient did have a GI panel obtained outpatient by his family physician.  I did review this and it is noted that he had no evidence of infectious process with a negative GI panel.  Patient was given IV fluid bolus.  Patient will be discharged.  Instructed follow back with his family physician for further outpatient evaluation as needed.    Amount and/or Complexity of Data Reviewed  External Data Reviewed: labs.  Labs: ordered. Decision-making details documented in ED Course.  Radiology: ordered and independent interpretation performed.          Final diagnoses:   Diarrhea, unspecified type       ED Disposition  ED Disposition     ED Disposition   Discharge    Condition   Stable    Comment   --             No follow-up provider specified.        Medication List      No changes were made to your prescriptions during this visit.          Braeden Villa MD  02/06/23 [id number]

## 2023-02-07 NOTE — ED NOTES
Patient evaluated by provider and will return to waiting room with Intravenous line in place.  Patient has been instructed not to inject anything into IV, or leave premises with line in place. Patient pending further evaluation, treatment, testing / monitoring.      PAST MEDICAL HISTORY:  Aortic aneurysm     Asthma     Atrial fibrillation     Bronchitis     Cardiomyopathy     COPD (chronic obstructive pulmonary disease)     Crohns disease     Enlarged prostate     Hypertension     Mediterranean anemia     Pacemaker     Paroxysmal atrial fibrillation     Pericarditis     Ventricular tachycardia

## 2023-02-08 ENCOUNTER — TELEPHONE (OUTPATIENT)
Dept: FAMILY MEDICINE CLINIC | Facility: CLINIC | Age: 73
End: 2023-02-08
Payer: MEDICARE

## 2023-02-08 DIAGNOSIS — R19.7 DIARRHEA, UNSPECIFIED TYPE: Primary | ICD-10-CM

## 2023-02-08 NOTE — TELEPHONE ENCOUNTER
Spoke with Rylee spivey nd she will call to schedule an appointment with [facility]

## 2023-02-08 NOTE — TELEPHONE ENCOUNTER
Caller: JOLLY CABRALES    Relationship: Emergency Contact    Best call back number: [phone]    What is the medical concern/diagnosis: GASTRO    What specialty or service is being requested: GASTROENTEROLOGY    What is the provider, practice or medical service name: DR DALE  What is the office location:[address] [place] [address]     What is the office phone number: [phone]    Any additional details: CALLED AND WILL NEED THE REFERRAL , MADE AN APPOINTMENT FOR 2/9/23

## 2023-02-08 NOTE — TELEPHONE ENCOUNTER
Patient was in the ER Monday night and he is not getting better.  He has had 4 diarrhea bowel movements already this morning.  He has lost 3 more pounds since you saw him last and he is getting weaker.  What do you recommend wife do for patient?

## 2023-02-09 ENCOUNTER — OFFICE (OUTPATIENT)
Dept: URBAN - METROPOLITAN AREA CLINIC 64 | Facility: CLINIC | Age: 73
End: 2023-02-09

## 2023-02-09 VITALS
DIASTOLIC BLOOD PRESSURE: 77 MMHG | WEIGHT: 151 LBS | HEIGHT: 72 IN | SYSTOLIC BLOOD PRESSURE: 120 MMHG | HEART RATE: 95 BPM

## 2023-02-09 DIAGNOSIS — R93.3 ABNORMAL FINDINGS ON DIAGNOSTIC IMAGING OF OTHER PARTS OF DI: ICD-10-CM

## 2023-02-09 DIAGNOSIS — R19.7 DIARRHEA, UNSPECIFIED: ICD-10-CM

## 2023-02-09 PROCEDURE — 99204 OFFICE O/P NEW MOD 45 MIN: CPT | Performed by: INTERNAL MEDICINE

## 2023-02-09 RX ORDER — CIPROFLOXACIN 250 MG/1
500 TABLET, FILM COATED ORAL
Qty: 42 | Refills: 0 | Status: ACTIVE
Start: 2023-02-09

## 2023-02-09 RX ORDER — METRONIDAZOLE 250 MG/1
750 TABLET, FILM COATED ORAL
Qty: 42 | Refills: 0 | Status: ACTIVE
Start: 2023-02-09

## 2023-02-10 DIAGNOSIS — R19.7 DIARRHEA, UNSPECIFIED TYPE: ICD-10-CM

## 2023-02-10 RX ORDER — DIPHENOXYLATE HYDROCHLORIDE AND ATROPINE SULFATE 2.5; .025 MG/1; MG/1
1 TABLET ORAL 4 TIMES DAILY PRN
Qty: 40 TABLET | Refills: 1 | Status: SHIPPED | OUTPATIENT
Start: 2023-02-10 | End: 2023-04-05

## 2023-02-10 RX ORDER — DICLOFENAC SODIUM 75 MG/1
TABLET, DELAYED RELEASE ORAL
Qty: 60 TABLET | Refills: 0 | Status: SHIPPED | OUTPATIENT
Start: 2023-02-10 | End: 2023-03-15

## 2023-02-20 ENCOUNTER — OFFICE VISIT (OUTPATIENT)
Dept: FAMILY MEDICINE CLINIC | Facility: CLINIC | Age: 73
End: 2023-02-20
Payer: MEDICARE

## 2023-02-20 ENCOUNTER — TELEPHONE (OUTPATIENT)
Dept: FAMILY MEDICINE CLINIC | Facility: CLINIC | Age: 73
End: 2023-02-20
Payer: MEDICARE

## 2023-02-20 ENCOUNTER — TELEPHONE (OUTPATIENT)
Dept: FAMILY MEDICINE CLINIC | Facility: CLINIC | Age: 73
End: 2023-02-20

## 2023-02-20 ENCOUNTER — LAB (OUTPATIENT)
Dept: FAMILY MEDICINE CLINIC | Facility: CLINIC | Age: 73
End: 2023-02-20
Payer: MEDICARE

## 2023-02-20 VITALS
RESPIRATION RATE: 18 BRPM | OXYGEN SATURATION: 97 % | TEMPERATURE: 98.7 F | HEIGHT: 72 IN | HEART RATE: 100 BPM | WEIGHT: 149.6 LBS | DIASTOLIC BLOOD PRESSURE: 60 MMHG | BODY MASS INDEX: 20.26 KG/M2 | SYSTOLIC BLOOD PRESSURE: 112 MMHG

## 2023-02-20 DIAGNOSIS — L53.9 REDNESS OF SKIN: ICD-10-CM

## 2023-02-20 DIAGNOSIS — T88.7XXA MEDICATION SIDE EFFECT: ICD-10-CM

## 2023-02-20 DIAGNOSIS — M25.531 PAIN OF BOTH WRIST JOINTS: Primary | ICD-10-CM

## 2023-02-20 DIAGNOSIS — M25.532 PAIN OF BOTH WRIST JOINTS: Primary | ICD-10-CM

## 2023-02-20 LAB
ALBUMIN SERPL-MCNC: 2.5 G/DL (ref 3.5–5.2)
ALBUMIN/GLOB SERPL: 0.6 G/DL
ALP SERPL-CCNC: 193 U/L (ref 39–117)
ALT SERPL W P-5'-P-CCNC: 8 U/L (ref 1–41)
ANION GAP SERPL CALCULATED.3IONS-SCNC: 3 MMOL/L (ref 5–15)
AST SERPL-CCNC: 9 U/L (ref 1–40)
BASOPHILS # BLD AUTO: 0.04 10*3/MM3 (ref 0–0.2)
BASOPHILS NFR BLD AUTO: 0.3 % (ref 0–1.5)
BILIRUB SERPL-MCNC: 0.3 MG/DL (ref 0–1.2)
BUN SERPL-MCNC: 10 MG/DL (ref 8–23)
BUN/CREAT SERPL: 15.2 (ref 7–25)
CALCIUM SPEC-SCNC: 8.6 MG/DL (ref 8.6–10.5)
CHLORIDE SERPL-SCNC: 99 MMOL/L (ref 98–107)
CO2 SERPL-SCNC: 26 MMOL/L (ref 22–29)
CREAT SERPL-MCNC: 0.66 MG/DL (ref 0.76–1.27)
CRP SERPL-MCNC: 22.99 MG/DL (ref 0–0.5)
DEPRECATED RDW RBC AUTO: 39.2 FL (ref 37–54)
EGFRCR SERPLBLD CKD-EPI 2021: 100.3 ML/MIN/1.73
EOSINOPHIL # BLD AUTO: 0.03 10*3/MM3 (ref 0–0.4)
EOSINOPHIL NFR BLD AUTO: 0.2 % (ref 0.3–6.2)
ERYTHROCYTE [DISTWIDTH] IN BLOOD BY AUTOMATED COUNT: 13.1 % (ref 12.3–15.4)
GLOBULIN UR ELPH-MCNC: 3.9 GM/DL
GLUCOSE SERPL-MCNC: 109 MG/DL (ref 65–99)
HCT VFR BLD AUTO: 30.8 % (ref 37.5–51)
HGB BLD-MCNC: 10.1 G/DL (ref 13–17.7)
IMM GRANULOCYTES # BLD AUTO: 0.25 10*3/MM3 (ref 0–0.05)
IMM GRANULOCYTES NFR BLD AUTO: 2 % (ref 0–0.5)
LYMPHOCYTES # BLD AUTO: 0.8 10*3/MM3 (ref 0.7–3.1)
LYMPHOCYTES NFR BLD AUTO: 6.4 % (ref 19.6–45.3)
MCH RBC QN AUTO: 27 PG (ref 26.6–33)
MCHC RBC AUTO-ENTMCNC: 32.8 G/DL (ref 31.5–35.7)
MCV RBC AUTO: 82.4 FL (ref 79–97)
MONOCYTES # BLD AUTO: 1.49 10*3/MM3 (ref 0.1–0.9)
MONOCYTES NFR BLD AUTO: 11.9 % (ref 5–12)
NEUTROPHILS NFR BLD AUTO: 79.2 % (ref 42.7–76)
NEUTROPHILS NFR BLD AUTO: 9.9 10*3/MM3 (ref 1.7–7)
NRBC BLD AUTO-RTO: 0 /100 WBC (ref 0–0.2)
PLATELET # BLD AUTO: 714 10*3/MM3 (ref 140–450)
PMV BLD AUTO: 7.9 FL (ref 6–12)
POTASSIUM SERPL-SCNC: 4.4 MMOL/L (ref 3.5–5.2)
PROT SERPL-MCNC: 6.4 G/DL (ref 6–8.5)
RBC # BLD AUTO: 3.74 10*6/MM3 (ref 4.14–5.8)
SODIUM SERPL-SCNC: 128 MMOL/L (ref 136–145)
WBC NRBC COR # BLD: 12.51 10*3/MM3 (ref 3.4–10.8)

## 2023-02-20 PROCEDURE — 86140 C-REACTIVE PROTEIN: CPT | Performed by: STUDENT IN AN ORGANIZED HEALTH CARE EDUCATION/TRAINING PROGRAM

## 2023-02-20 PROCEDURE — 36415 COLL VENOUS BLD VENIPUNCTURE: CPT | Performed by: STUDENT IN AN ORGANIZED HEALTH CARE EDUCATION/TRAINING PROGRAM

## 2023-02-20 PROCEDURE — 80053 COMPREHEN METABOLIC PANEL: CPT | Performed by: STUDENT IN AN ORGANIZED HEALTH CARE EDUCATION/TRAINING PROGRAM

## 2023-02-20 PROCEDURE — 99214 OFFICE O/P EST MOD 30 MIN: CPT | Performed by: STUDENT IN AN ORGANIZED HEALTH CARE EDUCATION/TRAINING PROGRAM

## 2023-02-20 PROCEDURE — 85025 COMPLETE CBC W/AUTO DIFF WBC: CPT | Performed by: STUDENT IN AN ORGANIZED HEALTH CARE EDUCATION/TRAINING PROGRAM

## 2023-02-20 RX ORDER — METRONIDAZOLE 250 MG/1
TABLET ORAL
COMMUNITY
Start: 2023-02-09 | End: 2023-04-05

## 2023-02-20 RX ORDER — CIPROFLOXACIN 250 MG/1
1 TABLET, FILM COATED ORAL EVERY 12 HOURS SCHEDULED
COMMUNITY
Start: 2023-02-09 | End: 2023-04-05

## 2023-02-20 RX ORDER — HYDROCODONE BITARTRATE AND ACETAMINOPHEN 7.5; 325 MG/1; MG/1
1 TABLET ORAL EVERY 6 HOURS PRN
Qty: 21 TABLET | Refills: 0 | Status: SHIPPED | OUTPATIENT
Start: 2023-02-20 | End: 2023-04-05

## 2023-02-20 RX ORDER — OXYCODONE HYDROCHLORIDE AND ACETAMINOPHEN 5; 325 MG/1; MG/1
1 TABLET ORAL EVERY 6 HOURS PRN
Qty: 21 TABLET | Refills: 0 | Status: SHIPPED | OUTPATIENT
Start: 2023-02-20 | End: 2023-04-05

## 2023-02-20 NOTE — TELEPHONE ENCOUNTER
[facility] CALLED TO SAY THEY ARE OUT OF HYDROCODONE 7.5 AND IT IS ON MANUFACTURE BACK ORDER. WOULD YOU LIKE TO SEND IN SOMETHING ELSE OR I CAN HAVE THE PATIENT CALL AROUND TO SEE IF ANYONE ELSE HAS IT IN STOCK?

## 2023-02-20 NOTE — PROGRESS NOTES
"Chief Complaint  Chief Complaint   Patient presents with   • Generalized Body Aches     CC: 2 weeks ago he was put on Cipro and flagyl Dr. Bautista sent him GI Dr. He prescribed the meds above. He started hurting right and Dr told him stop the cipro and thehurting has not gone away and progressively worse. 4 days ago started with severe pain and stiffness in all joints and wrists has developed very swollen boil like places on both wrists, bones popping. He can not pick anything up, can not lift arm over head and now today he is needing assistance to even stand and shuffling his feet worse     Subjective        Pramod Trent is a 72 y.o. male who presents to [facility].    History of Present Illness  Initial symptom was diarrhea.  He went to the ED on 2/6 and had a CT scan showing probably inflammatory or infectious enteritis.  He was seen by GI in f/u and started on ciprofloxacin and flagyl as above.  He developed significant joint pains and the cipro was stopped.  His joint pains / weakness have continued to worsen.  He was prescribed total 14 days of the flagyl.  He has some red spots that have popped up on his wrists bilaterally.  No open sores or draining.  More difficulty pushing up from sitting position d/t pain in wrists.      In July of 2022 he had partial colectomy d/t infection / abscess.  He has a colostomy bag.      Objective   /60   Pulse 100   Temp 98.7 °F (37.1 °C) (Oral)   Resp 18   Ht 182.9 cm (72\")   Wt 67.9 kg (149 lb 9.6 oz)   SpO2 97%   BMI 20.29 kg/m²     Estimated body mass index is 20.29 kg/m² as calculated from the following:    Height as of this encounter: 182.9 cm (72\").    Weight as of this encounter: 67.9 kg (149 lb 9.6 oz).     Physical Exam   GEN: In no acute distress, non toxic appearing  HEENT: Pupils equal and reactive to light, sclera clear  SKIN: Bilateral erythema to wrists, no warmth, no open areas or draining.  Mild ttp.  L ring " finger with mild swelling and erythema (he reports distant finger injury that flares up from time to time).    MSK: slow gait but steady     Result Review :              Assessment and Plan     Diagnoses and all orders for this visit:    1. Pain of both wrist joints (Primary)  Differential broad including inflammatory arthritis, medication hypersensitivity, septic joint, gout.  Clear timing of onset with addition of ciprofloxacin and Flagyl.  He has not improved with removing the ciprofloxacin and has completed 10 days of Flagyl so recommend stopping.  His diarrhea has resolved.  We will obtain labs today including CBC to check white count, CRP to assess inflammation and CMP to check liver and kidney function.  We will give small supply of Norco given worsening of pain that is not controlled with tramadol.  If any worsening I explicitly stated that they should go to the ED.  If blood work is normal could consider steroid but did not want to start today in case of possible infection.  -     HYDROcodone-acetaminophen (Norco) 7.5-325 MG per tablet; Take 1 tablet by mouth Every 6 (Six) Hours As Needed for Moderate Pain.  Dispense: 21 tablet; Refill: 0  -     CBC & Differential  -     Comprehensive metabolic panel  -     C-reactive protein    2. Redness of skin  See above    3. Medication side effect  See above       Follow Up   If any worsening go to ED.  If no improvement in 1 week return

## 2023-02-21 ENCOUNTER — TELEPHONE (OUTPATIENT)
Dept: FAMILY MEDICINE CLINIC | Facility: CLINIC | Age: 73
End: 2023-02-21
Payer: MEDICARE

## 2023-02-21 NOTE — TELEPHONE ENCOUNTER
HUB TO SHARE: LEFT VM. LET PATIENT KNOW THAT [facility] CALLED TO SAY THEY WERE OUT OF [place] SO WE SENT PERCOCET INSTEAD.

## 2023-02-21 NOTE — TELEPHONE ENCOUNTER
Pharmacy Name:  [facility]    Pharmacy representative name: AB    Pharmacy representative phone number: [phone]    What medication are you calling in regards to:oxyCODONE-acetaminophen (Percocet) 5-325 MG per tablet    What question does the pharmacy have: HE IS ALREADY ON TRAMADOL AND LOMOTIL.  IS IT FOR AN ACUTE REASON THAT HE IS TO BE ON THE OXYCODONE AND IS HE SUPPOSED TO STAY ON THE OTHER TWO AS WELL?      Who is the provider that prescribed the medication:VILLA

## 2023-02-21 NOTE — PROGRESS NOTES
I called and spoke with Pramod's wife regarding recent lab work which had multiple abnormalities: worsening hyponatremia, hypoalbuminemia, increasing CRP, increasing leukocytosis with elevated neutrophils, hemoglobin drop of 2.4 points.  She reports he is not any better today and also reports that she has seen blood in his colostomy bag today.  She is going to take him to the emergency room for further evaluation which I agree with.

## 2023-02-21 NOTE — TELEPHONE ENCOUNTER
DR LONGO SAW PATIENT YESTERDAY AND ORDERED LABS. PATIENT'S WIFE SAW THE RESULTS ON MYCHART AND IS CONCERNED ABOUT SO MANY VALUES BEING OUT OF RANGE AND WOULD LIKE A CALL BACK TO DISCUSS. SHE IS VERY CONCERNED WITH THE LABS PLUS HIS RECENT WEIGHT LOSS, FATIGUE, AND LOSS OF APPETITE. SHE IS CONSIDERING TAKING HIM TO [place] THIS AFTERNOON IF SHE DOES NOT HEAR BACK FROM US.

## 2023-03-14 DIAGNOSIS — Z90.49 S/P COLON RESECTION: Chronic | ICD-10-CM

## 2023-03-15 RX ORDER — DICLOFENAC SODIUM 75 MG/1
TABLET, DELAYED RELEASE ORAL
Qty: 60 TABLET | Refills: 3 | Status: SHIPPED | OUTPATIENT
Start: 2023-03-15 | End: 2023-03-16 | Stop reason: SDUPTHER

## 2023-03-15 RX ORDER — TRAMADOL HYDROCHLORIDE 50 MG/1
TABLET ORAL
Qty: 60 TABLET | Refills: 0 | Status: SHIPPED | OUTPATIENT
Start: 2023-03-15

## 2023-03-16 DIAGNOSIS — Z90.49 S/P COLON RESECTION: Chronic | ICD-10-CM

## 2023-03-16 RX ORDER — TRAMADOL HYDROCHLORIDE 50 MG/1
50 TABLET ORAL EVERY 12 HOURS PRN
Qty: 60 TABLET | Refills: 0 | Status: CANCELLED | OUTPATIENT
Start: 2023-03-16

## 2023-03-16 RX ORDER — DICLOFENAC SODIUM 75 MG/1
75 TABLET, DELAYED RELEASE ORAL 2 TIMES DAILY
Qty: 60 TABLET | Refills: 3 | Status: SHIPPED | OUTPATIENT
Start: 2023-03-16

## 2023-04-05 ENCOUNTER — OFFICE VISIT (OUTPATIENT)
Dept: FAMILY MEDICINE CLINIC | Facility: CLINIC | Age: 73
End: 2023-04-05
Payer: MEDICARE

## 2023-04-05 VITALS
HEIGHT: 72 IN | OXYGEN SATURATION: 97 % | BODY MASS INDEX: 21.32 KG/M2 | WEIGHT: 157.4 LBS | DIASTOLIC BLOOD PRESSURE: 68 MMHG | HEART RATE: 92 BPM | SYSTOLIC BLOOD PRESSURE: 100 MMHG

## 2023-04-05 DIAGNOSIS — F41.9 ANXIETY: ICD-10-CM

## 2023-04-05 DIAGNOSIS — F51.01 PRIMARY INSOMNIA: ICD-10-CM

## 2023-04-05 DIAGNOSIS — L71.9 ROSACEA: Primary | ICD-10-CM

## 2023-04-05 PROBLEM — K94.01: Status: ACTIVE | Noted: 2023-02-21

## 2023-04-05 PROBLEM — M54.9 BACK PAIN: Status: ACTIVE | Noted: 2023-04-05

## 2023-04-05 PROBLEM — N32.89 BLADDER WALL THICKENING: Status: ACTIVE | Noted: 2023-02-21

## 2023-04-05 PROBLEM — R19.7 DIARRHEA: Status: ACTIVE | Noted: 2023-04-05

## 2023-04-05 PROBLEM — L02.519 HAND ABSCESS: Status: ACTIVE | Noted: 2023-02-21

## 2023-04-05 PROBLEM — R93.3 ABNORMAL FINDINGS ON DIAGNOSTIC IMAGING OF OTHER PARTS OF DIGESTIVE TRACT: Status: ACTIVE | Noted: 2023-04-05

## 2023-04-05 PROCEDURE — 3074F SYST BP LT 130 MM HG: CPT | Performed by: STUDENT IN AN ORGANIZED HEALTH CARE EDUCATION/TRAINING PROGRAM

## 2023-04-05 PROCEDURE — 3078F DIAST BP <80 MM HG: CPT | Performed by: STUDENT IN AN ORGANIZED HEALTH CARE EDUCATION/TRAINING PROGRAM

## 2023-04-05 PROCEDURE — 99214 OFFICE O/P EST MOD 30 MIN: CPT | Performed by: STUDENT IN AN ORGANIZED HEALTH CARE EDUCATION/TRAINING PROGRAM

## 2023-04-05 RX ORDER — CITALOPRAM 10 MG/1
10 TABLET ORAL DAILY
COMMUNITY
End: 2023-04-05

## 2023-04-05 RX ORDER — QUETIAPINE FUMARATE 25 MG/1
25 TABLET, FILM COATED ORAL NIGHTLY
Qty: 30 TABLET | Refills: 2 | Status: SHIPPED | OUTPATIENT
Start: 2023-04-05

## 2023-04-05 RX ORDER — DICLOFENAC SODIUM 25 MG/1
25 TABLET, DELAYED RELEASE ORAL
COMMUNITY
End: 2023-04-05

## 2023-04-05 RX ORDER — METRONIDAZOLE 10 MG/G
GEL TOPICAL DAILY
Qty: 60 G | Refills: 1 | Status: SHIPPED | OUTPATIENT
Start: 2023-04-05

## 2023-04-05 RX ORDER — PREDNISONE 10 MG/1
TABLET ORAL
COMMUNITY
Start: 2023-03-22 | End: 2023-04-05

## 2023-04-05 RX ORDER — TRAMADOL HYDROCHLORIDE 50 MG/1
50 TABLET ORAL
COMMUNITY
End: 2023-04-05

## 2023-04-05 NOTE — PROGRESS NOTES
"Chief Complaint  Chief Complaint   Patient presents with   • Rash     Subjective        Pramod Trent is a 72 y.o. male who presents to [facility].    History of Present Illness  Facial rash started 4 days ago.  Does not itch.  He has been taking benadryl which seems to be helping.  He says his mother used to get something like this.  He woke up with it and it started spreading.  No rashes anywhere else.      His wife with him also says he is very high strung which she thinks is making all of his issues worse.  He is irritable and cannot relax.  He is on celexa but does not feel it offers any benefit.  He does not sleep well at night either.      Objective   /68   Pulse 92   Ht 182.9 cm (72\")   Wt 71.4 kg (157 lb 6.4 oz)   SpO2 97%   BMI 21.35 kg/m²     Estimated body mass index is 21.35 kg/m² as calculated from the following:    Height as of this encounter: 182.9 cm (72\").    Weight as of this encounter: 71.4 kg (157 lb 6.4 oz).     Physical Exam   GEN: In no acute distress, non toxic appearing  HEENT: Pupils equal and reactive to light, sclera clear.  SKIN: Erythematous rash scattered on chin, nose, bilateral facial cheeks, some above eyes.      Result Review :              Assessment and Plan     Diagnoses and all orders for this visit:    1. Rosacea (Primary)  Exam consistent with rosacea.  He reports mother with similar issue.  Treat with topical metronidazole 1% daily.  Let us know if no improvement.    -     metroNIDAZOLE (METROGEL) 1 % gel; Apply  topically to the appropriate area as directed Daily.  Dispense: 60 g; Refill: 1    2. Anxiety  Stop celexa as no benefit.  Trial seroquel 25 mg nightly which may be helpful for mood and sleep.  -     QUEtiapine (SEROquel) 25 MG tablet; Take 1 tablet by mouth Every Night.  Dispense: 30 tablet; Refill: 2    3. Primary insomnia  See above  -     QUEtiapine (SEROquel) 25 MG tablet; Take 1 tablet by mouth Every Night.  " Dispense: 30 tablet; Refill:

## 2023-04-11 RX ORDER — TAMSULOSIN HYDROCHLORIDE 0.4 MG/1
1 CAPSULE ORAL DAILY
Qty: 90 CAPSULE | Refills: 1 | Status: SHIPPED | OUTPATIENT
Start: 2023-04-11

## 2023-04-11 RX ORDER — TAMSULOSIN HYDROCHLORIDE 0.4 MG/1
1 CAPSULE ORAL DAILY
Qty: 90 CAPSULE | Refills: 1 | Status: SHIPPED | OUTPATIENT
Start: 2023-04-11 | End: 2023-04-11 | Stop reason: SDUPTHER

## 2023-04-13 DIAGNOSIS — Z90.49 S/P COLON RESECTION: Chronic | ICD-10-CM

## 2023-04-13 RX ORDER — TRAMADOL HYDROCHLORIDE 50 MG/1
TABLET ORAL
Qty: 60 TABLET | Refills: 0 | Status: SHIPPED | OUTPATIENT
Start: 2023-04-13

## 2023-05-04 ENCOUNTER — TRANSCRIBE ORDERS (OUTPATIENT)
Dept: ADMINISTRATIVE | Facility: HOSPITAL | Age: 73
End: 2023-05-04
Payer: MEDICARE

## 2023-05-04 ENCOUNTER — HOSPITAL ENCOUNTER (OUTPATIENT)
Dept: CARDIOLOGY | Facility: HOSPITAL | Age: 73
Discharge: HOME | End: 2023-05-04
Payer: MEDICARE

## 2023-05-04 ENCOUNTER — LAB (OUTPATIENT)
Dept: LAB | Facility: HOSPITAL | Age: 73
End: 2023-05-04
Payer: MEDICARE

## 2023-05-04 DIAGNOSIS — Z01.818 OTHER SPECIFIED PRE-OPERATIVE EXAMINATION: ICD-10-CM

## 2023-05-04 DIAGNOSIS — Z01.818 OTHER SPECIFIED PRE-OPERATIVE EXAMINATION: Primary | ICD-10-CM

## 2023-05-04 LAB
ANION GAP SERPL CALCULATED.3IONS-SCNC: 9 MMOL/L (ref 5–15)
BASOPHILS # BLD AUTO: 0.02 10*3/MM3 (ref 0–0.2)
BASOPHILS NFR BLD AUTO: 0.4 % (ref 0–1.5)
BUN SERPL-MCNC: 11 MG/DL (ref 8–23)
BUN/CREAT SERPL: 14.9 (ref 7–25)
CALCIUM SPEC-SCNC: 8.4 MG/DL (ref 8.6–10.5)
CHLORIDE SERPL-SCNC: 98 MMOL/L (ref 98–107)
CO2 SERPL-SCNC: 27 MMOL/L (ref 22–29)
CREAT SERPL-MCNC: 0.74 MG/DL (ref 0.76–1.27)
DEPRECATED RDW RBC AUTO: 43.8 FL (ref 37–54)
EGFRCR SERPLBLD CKD-EPI 2021: 96.9 ML/MIN/1.73
EOSINOPHIL # BLD AUTO: 0.08 10*3/MM3 (ref 0–0.4)
EOSINOPHIL NFR BLD AUTO: 1.5 % (ref 0.3–6.2)
ERYTHROCYTE [DISTWIDTH] IN BLOOD BY AUTOMATED COUNT: 15.4 % (ref 12.3–15.4)
GLUCOSE SERPL-MCNC: 107 MG/DL (ref 65–99)
HCT VFR BLD AUTO: 32.3 % (ref 37.5–51)
HGB BLD-MCNC: 10.7 G/DL (ref 13–17.7)
IMM GRANULOCYTES # BLD AUTO: 0.13 10*3/MM3 (ref 0–0.05)
IMM GRANULOCYTES NFR BLD AUTO: 2.4 % (ref 0–0.5)
LYMPHOCYTES # BLD AUTO: 0.8 10*3/MM3 (ref 0.7–3.1)
LYMPHOCYTES NFR BLD AUTO: 15 % (ref 19.6–45.3)
MCH RBC QN AUTO: 26.4 PG (ref 26.6–33)
MCHC RBC AUTO-ENTMCNC: 33.1 G/DL (ref 31.5–35.7)
MCV RBC AUTO: 79.8 FL (ref 79–97)
MONOCYTES # BLD AUTO: 0.76 10*3/MM3 (ref 0.1–0.9)
MONOCYTES NFR BLD AUTO: 14.2 % (ref 5–12)
NEUTROPHILS NFR BLD AUTO: 3.56 10*3/MM3 (ref 1.7–7)
NEUTROPHILS NFR BLD AUTO: 66.5 % (ref 42.7–76)
NRBC BLD AUTO-RTO: 0 /100 WBC (ref 0–0.2)
PLATELET # BLD AUTO: 478 10*3/MM3 (ref 140–450)
PMV BLD AUTO: 7.8 FL (ref 6–12)
POTASSIUM SERPL-SCNC: 3.8 MMOL/L (ref 3.5–5.2)
QT INTERVAL: 347 MS
RBC # BLD AUTO: 4.05 10*6/MM3 (ref 4.14–5.8)
SODIUM SERPL-SCNC: 134 MMOL/L (ref 136–145)
WBC NRBC COR # BLD: 5.35 10*3/MM3 (ref 3.4–10.8)

## 2023-05-04 PROCEDURE — 93005 ELECTROCARDIOGRAM TRACING: CPT | Performed by: PODIATRIST

## 2023-05-04 PROCEDURE — 85025 COMPLETE CBC W/AUTO DIFF WBC: CPT

## 2023-05-04 PROCEDURE — 36415 COLL VENOUS BLD VENIPUNCTURE: CPT

## 2023-05-04 PROCEDURE — 80048 BASIC METABOLIC PNL TOTAL CA: CPT

## 2023-05-09 ENCOUNTER — TELEPHONE (OUTPATIENT)
Dept: FAMILY MEDICINE CLINIC | Facility: CLINIC | Age: 73
End: 2023-05-09
Payer: MEDICARE

## 2023-05-09 NOTE — TELEPHONE ENCOUNTER
Please fax patient's 4/5/2023 office note to Cherrie with [facility] at fax [phone] ASAP.

## 2023-05-18 RX ORDER — TAMSULOSIN HYDROCHLORIDE 0.4 MG/1
1 CAPSULE ORAL DAILY
Qty: 90 CAPSULE | Refills: 0 | Status: SHIPPED | OUTPATIENT
Start: 2023-05-18

## 2023-05-21 DIAGNOSIS — Z90.49 S/P COLON RESECTION: Chronic | ICD-10-CM

## 2023-05-22 RX ORDER — DICLOFENAC SODIUM 75 MG/1
75 TABLET, DELAYED RELEASE ORAL 2 TIMES DAILY
Qty: 60 TABLET | Refills: 3 | Status: SHIPPED | OUTPATIENT
Start: 2023-05-22

## 2023-05-22 RX ORDER — TRAMADOL HYDROCHLORIDE 50 MG/1
50 TABLET ORAL EVERY 12 HOURS PRN
Qty: 60 TABLET | Refills: 0 | Status: SHIPPED | OUTPATIENT
Start: 2023-05-22

## 2023-07-31 DIAGNOSIS — Z90.49 S/P COLON RESECTION: Chronic | ICD-10-CM

## 2023-08-01 RX ORDER — TRAMADOL HYDROCHLORIDE 50 MG/1
TABLET ORAL
Qty: 60 TABLET | Refills: 0 | Status: SHIPPED | OUTPATIENT
Start: 2023-08-01

## 2023-08-10 DIAGNOSIS — F51.01 PRIMARY INSOMNIA: ICD-10-CM

## 2023-08-10 DIAGNOSIS — F41.9 ANXIETY: ICD-10-CM

## 2023-08-11 RX ORDER — QUETIAPINE FUMARATE 25 MG/1
TABLET, FILM COATED ORAL
Qty: 30 TABLET | Refills: 0 | Status: SHIPPED | OUTPATIENT
Start: 2023-08-11

## 2023-08-28 ENCOUNTER — OFFICE VISIT (OUTPATIENT)
Dept: FAMILY MEDICINE CLINIC | Facility: CLINIC | Age: 73
End: 2023-08-28
Payer: MEDICARE

## 2023-08-28 VITALS
HEIGHT: 72 IN | WEIGHT: 141 LBS | RESPIRATION RATE: 16 BRPM | OXYGEN SATURATION: 99 % | SYSTOLIC BLOOD PRESSURE: 110 MMHG | BODY MASS INDEX: 19.1 KG/M2 | DIASTOLIC BLOOD PRESSURE: 70 MMHG | HEART RATE: 91 BPM

## 2023-08-28 DIAGNOSIS — F41.9 ANXIETY: Primary | ICD-10-CM

## 2023-08-28 DIAGNOSIS — D50.0 IRON DEFICIENCY ANEMIA DUE TO CHRONIC BLOOD LOSS: ICD-10-CM

## 2023-08-28 DIAGNOSIS — N40.0 BENIGN PROSTATIC HYPERPLASIA, UNSPECIFIED WHETHER LOWER URINARY TRACT SYMPTOMS PRESENT: ICD-10-CM

## 2023-08-28 DIAGNOSIS — G89.4 CHRONIC PAIN SYNDROME: ICD-10-CM

## 2023-08-28 DIAGNOSIS — K50.919 CROHN'S DISEASE WITH COMPLICATION, UNSPECIFIED GASTROINTESTINAL TRACT LOCATION: ICD-10-CM

## 2023-08-28 PROCEDURE — 1160F RVW MEDS BY RX/DR IN RCRD: CPT | Performed by: STUDENT IN AN ORGANIZED HEALTH CARE EDUCATION/TRAINING PROGRAM

## 2023-08-28 PROCEDURE — 3078F DIAST BP <80 MM HG: CPT | Performed by: STUDENT IN AN ORGANIZED HEALTH CARE EDUCATION/TRAINING PROGRAM

## 2023-08-28 PROCEDURE — 99214 OFFICE O/P EST MOD 30 MIN: CPT | Performed by: STUDENT IN AN ORGANIZED HEALTH CARE EDUCATION/TRAINING PROGRAM

## 2023-08-28 PROCEDURE — 1159F MED LIST DOCD IN RCRD: CPT | Performed by: STUDENT IN AN ORGANIZED HEALTH CARE EDUCATION/TRAINING PROGRAM

## 2023-08-28 PROCEDURE — 3074F SYST BP LT 130 MM HG: CPT | Performed by: STUDENT IN AN ORGANIZED HEALTH CARE EDUCATION/TRAINING PROGRAM

## 2023-08-28 RX ORDER — INFLIXIMAB 100 MG/10ML
INJECTION, POWDER, LYOPHILIZED, FOR SOLUTION INTRAVENOUS
COMMUNITY
Start: 2023-06-27

## 2023-08-28 RX ORDER — PREDNISONE 20 MG/1
40 TABLET ORAL DAILY
Qty: 60 TABLET | Refills: 0 | COMMUNITY
Start: 2023-08-23 | End: 2023-09-22

## 2023-08-28 RX ORDER — ONDANSETRON 4 MG/1
1 TABLET, FILM COATED ORAL EVERY 6 HOURS PRN
COMMUNITY
Start: 2023-08-23

## 2023-08-28 RX ORDER — CHOLESTYRAMINE LIGHT 4 G/5.7G
4 POWDER, FOR SUSPENSION ORAL 4 TIMES DAILY
Qty: 120 PACKET | Refills: 0 | COMMUNITY
Start: 2023-08-23 | End: 2023-09-22

## 2023-08-28 NOTE — PROGRESS NOTES
"Chief Complaint  Chief Complaint   Patient presents with    Establish Care     Subjective        Pramod Trent is a 73 y.o. male who presents to [facility].    History of Present Illness  Here to establish care with me.  We reviewed medical history and current medications.    Anxiety on seroquel 25 mg nightly.  BPH on flomax 0.4 mg daily.  Chronic pain on diclofenac and tramadol.      Sees Dr. Lyons for Crohn's.  He is on remicade infusions.  Needing prednisone in between.  He has colostomy bag.  Takes cholestyramine to thicken.      Tomorrow he is starting physical therapy 2x/wk for a few months.      Objective   /70   Pulse 91   Resp 16   Ht 182.9 cm (72\")   Wt 64 kg (141 lb)   SpO2 99%   BMI 19.12 kg/mý     Estimated body mass index is 19.12 kg/mý as calculated from the following:    Height as of this encounter: 182.9 cm (72\").    Weight as of this encounter: 64 kg (141 lb).     Physical Exam   GEN: In no acute distress, non toxic appearing, pale  CV: Regular rate and rhythm, no murmurs, 2+ peripheral pulses, No extremity edema.   RESP: Lungs clear to auscultation anteriorly and posteriorly in all lung fields bilaterally.  NEURO: AAO to person, place, and time. CN 2-12 intact grossly.   PSYCH: Affect normal, insight fair      Result Review :              Assessment and Plan     Diagnoses and all orders for this visit:    1. Anxiety (Primary)  Continue Seroquel 25 mg nightly.  2. Benign prostatic hyperplasia, unspecified whether lower urinary tract symptoms present  Continue Flomax 0.4 mg daily.  3. Chronic pain syndrome  Continue tramadol 50 mg as needed and diclofenac 75 mg twice daily.  4. Crohn's disease with complication, unspecified gastrointestinal tract location  Continue Remicade, prednisone, follow-up with GI.  5. Iron deficiency anemia due to chronic blood loss  Ensure his multivitamin has iron.  Ultimately the solution to this is stopping his chronic blood " loss through his GI tract.  This is what he is working on with gastroenterology.       Follow Up     Return in about 15 weeks (around 12/11/2023) for Medicare Wellness.

## 2023-08-29 ENCOUNTER — TELEPHONE (OUTPATIENT)
Dept: FAMILY MEDICINE CLINIC | Facility: CLINIC | Age: 73
End: 2023-08-29
Payer: MEDICARE

## 2023-08-29 DIAGNOSIS — R53.1 GENERALIZED WEAKNESS: Primary | ICD-10-CM

## 2023-08-29 DIAGNOSIS — R53.81 PHYSICAL DECONDITIONING: ICD-10-CM

## 2023-08-29 DIAGNOSIS — D50.0 IRON DEFICIENCY ANEMIA DUE TO CHRONIC BLOOD LOSS: ICD-10-CM

## 2023-08-29 DIAGNOSIS — K50.919 CROHN'S DISEASE WITH COMPLICATION, UNSPECIFIED GASTROINTESTINAL TRACT LOCATION: ICD-10-CM

## 2023-08-29 NOTE — TELEPHONE ENCOUNTER
Orders signed.  If they call back and ask for a verbal because it is time sensitive, okay to give my verbal

## 2023-08-29 NOTE — TELEPHONE ENCOUNTER
Patient was in [facility] recently and they had called [facility].  Patient is now seeing Dr Wang, as of yesterday, and [facility] needs to know today if he will be ordering PT and OT for patient.  Said it is time sensitive and they need to know today.

## 2023-09-01 ENCOUNTER — TELEPHONE (OUTPATIENT)
Dept: FAMILY MEDICINE CLINIC | Facility: CLINIC | Age: 73
End: 2023-09-01
Payer: MEDICARE

## 2023-09-01 NOTE — TELEPHONE ENCOUNTER
[facility] saw patient for the first time today.  The patient is reporting excessive lethargy and weight loss since leaving the hospital.  He has been dizzy at times too.  He had rectal bleeding in the hospital so he was anemic.  Do you want to order any labs or have further orders for Home Health to do?  Please advise.

## 2023-09-04 RX ORDER — DICLOFENAC SODIUM 75 MG/1
75 TABLET, DELAYED RELEASE ORAL 2 TIMES DAILY
Qty: 60 TABLET | Refills: 3 | Status: SHIPPED | OUTPATIENT
Start: 2023-09-04

## 2023-09-05 RX ORDER — QUETIAPINE FUMARATE 100 MG/1
TABLET, FILM COATED ORAL
Qty: 30 TABLET | Refills: 0 | Status: SHIPPED | OUTPATIENT
Start: 2023-09-05

## 2023-09-05 NOTE — TELEPHONE ENCOUNTER
Gave message to Brandy with [facility] at 4:14pm.  Said she would keep us up to date.

## 2023-09-06 DIAGNOSIS — Z90.49 S/P COLON RESECTION: Chronic | ICD-10-CM

## 2023-09-06 RX ORDER — TRAMADOL HYDROCHLORIDE 50 MG/1
50 TABLET ORAL EVERY 12 HOURS PRN
Qty: 60 TABLET | Refills: 0 | Status: SHIPPED | OUTPATIENT
Start: 2023-09-06

## 2023-09-06 NOTE — TELEPHONE ENCOUNTER
----- Message from Shanel Duke MA sent at 9/6/2023  8:55 AM EDT -----  Regarding: FW: Tramadol   Contact: [phone]    ----- Message -----  From: Pramod Trent  Sent: 9/5/2023   6:34 PM EDT  To: [facility] Clinical Pool  Subject: Tramadol                                         I need a refill on my medication. Thank you. [facility] pharmacy

## 2023-09-07 RX ORDER — TAMSULOSIN HYDROCHLORIDE 0.4 MG/1
1 CAPSULE ORAL DAILY
Qty: 90 CAPSULE | Refills: 0 | Status: SHIPPED | OUTPATIENT
Start: 2023-09-07

## 2023-09-22 ENCOUNTER — TELEPHONE (OUTPATIENT)
Dept: FAMILY MEDICINE CLINIC | Facility: CLINIC | Age: 73
End: 2023-09-22
Payer: MEDICARE

## 2023-09-22 NOTE — TELEPHONE ENCOUNTER
[facility] wanted to let you know that patient cancelled his physical therapy visit today because he has an infusion.

## 2023-09-26 DIAGNOSIS — F51.01 PRIMARY INSOMNIA: ICD-10-CM

## 2023-09-26 DIAGNOSIS — F41.9 ANXIETY: ICD-10-CM

## 2023-09-26 RX ORDER — QUETIAPINE FUMARATE 25 MG/1
25 TABLET, FILM COATED ORAL NIGHTLY
Qty: 30 TABLET | Refills: 0 | Status: SHIPPED | OUTPATIENT
Start: 2023-09-26

## 2023-10-03 ENCOUNTER — TELEPHONE (OUTPATIENT)
Dept: FAMILY MEDICINE CLINIC | Facility: CLINIC | Age: 73
End: 2023-10-03
Payer: MEDICARE

## 2023-10-03 NOTE — TELEPHONE ENCOUNTER
Geovanny Wang, DO  You2 hours ago (9:24 AM)     JL  Yes okay to give verbal orders to DC PT.  Thanks!       Spoke to Sindhu at 1218pm, gave verbal order to DC PT.

## 2023-10-03 NOTE — TELEPHONE ENCOUNTER
Caller: GRISELDA CHE'S    Relationship: [facility]    Best call back number: [phone]     What orders are you requesting (i.e. lab or imaging): ORDERS TO DISCONTINUE PHYSICAL THERAPY    In what timeframe would the patient need to come in:     Where will you receive your lab/imaging services:     Additional notes: PATIENT MET GOALS SOONER THAN EXPECTED. PLEASE CALL WITH VERBAL ORDERS.

## 2023-10-07 DIAGNOSIS — Z90.49 S/P COLON RESECTION: Chronic | ICD-10-CM

## 2023-10-10 RX ORDER — TRAMADOL HYDROCHLORIDE 50 MG/1
50 TABLET ORAL EVERY 12 HOURS PRN
Qty: 60 TABLET | Refills: 0 | Status: SHIPPED | OUTPATIENT
Start: 2023-10-10

## 2023-10-18 RX ORDER — TAMSULOSIN HYDROCHLORIDE 0.4 MG/1
1 CAPSULE ORAL 2 TIMES DAILY
Qty: 90 CAPSULE | Refills: 0 | Status: SHIPPED | OUTPATIENT
Start: 2023-10-18

## 2023-10-18 RX ORDER — FINASTERIDE 5 MG/1
5 TABLET, FILM COATED ORAL DAILY
Qty: 90 TABLET | Refills: 0 | Status: SHIPPED | OUTPATIENT
Start: 2023-10-18

## 2023-10-18 RX ORDER — FINASTERIDE 5 MG/1
5 TABLET, FILM COATED ORAL DAILY
COMMUNITY
Start: 2023-09-19 | End: 2023-10-18 | Stop reason: SDUPTHER

## 2023-10-18 NOTE — TELEPHONE ENCOUNTER
Caller: JOLLY CABRALES    Relationship: Emergency Contact    Best call back number: [phone]     What medication are you requesting:   TAMSULOSIN THAT WAS INCREASED TO TWICE/DAILY BY [facility]  FINASTERIDE 5 MG ONCE/DAILY ORDERED BY [facility]:RUBEN DELVALLE    What are your current symptoms: IN HOSPITAL FILLED 09/18/23      If a prescription is needed, what is your preferred pharmacy and phone number: [address] [place] [address] [phone] [facility] [phone] FX     Additional notes:    TAMSULOSIN HE HAS ONE DAY LEFT  FINASTERIDE HE HAS NONE

## 2023-10-18 NOTE — TELEPHONE ENCOUNTER
He  filled #60 of Tamsulosin at [place] [facility]  by Naresh Cartagena  on 9/18/23.    Finasteride we have never filled. 9/19/23 he filled  at [place] [facility] as well. This was authorized by Joseluis Cartagena also        OK for changes and refill?    Pending if so

## 2023-10-23 DIAGNOSIS — F51.01 PRIMARY INSOMNIA: ICD-10-CM

## 2023-10-23 DIAGNOSIS — F41.9 ANXIETY: ICD-10-CM

## 2023-10-24 ENCOUNTER — OFFICE VISIT (OUTPATIENT)
Dept: FAMILY MEDICINE CLINIC | Facility: CLINIC | Age: 73
End: 2023-10-24
Payer: MEDICARE

## 2023-10-24 VITALS
SYSTOLIC BLOOD PRESSURE: 112 MMHG | RESPIRATION RATE: 18 BRPM | HEIGHT: 72 IN | DIASTOLIC BLOOD PRESSURE: 67 MMHG | OXYGEN SATURATION: 98 % | BODY MASS INDEX: 20.62 KG/M2 | HEART RATE: 88 BPM | WEIGHT: 152.2 LBS

## 2023-10-24 DIAGNOSIS — Z23 NEED FOR VACCINATION: ICD-10-CM

## 2023-10-24 DIAGNOSIS — K50.919 CROHN'S DISEASE WITH COMPLICATION, UNSPECIFIED GASTROINTESTINAL TRACT LOCATION: Primary | ICD-10-CM

## 2023-10-24 DIAGNOSIS — R53.1 GENERALIZED WEAKNESS: ICD-10-CM

## 2023-10-24 DIAGNOSIS — Z90.49 S/P COLON RESECTION: ICD-10-CM

## 2023-10-24 RX ORDER — QUETIAPINE FUMARATE 25 MG/1
25 TABLET, FILM COATED ORAL NIGHTLY
Qty: 30 TABLET | Refills: 0 | Status: SHIPPED | OUTPATIENT
Start: 2023-10-24

## 2023-10-24 NOTE — PROGRESS NOTES
"Chief Complaint  Chief Complaint   Patient presents with    Hospital Follow Up Visit     Subjective        Pramod Trent is a 73 y.o. male who presents to [facility].    History of Present Illness  Hospitalized at end of September for another crohn's flare.    He is doing well today.    He has had some good weight gain.   He was having some residual stool coming out of his rectum and bypassing the bag, that has stopped.  He feels stronger.  Able to walk longer distances.  He is doing some push ups.    The remicade is still every 2 months.  They are overlaying it with steroids to get him through.    He has quit drinking coffee and alcohol.    He would like flu shot today.  Will go to pharmacy to get shingles vaccine.      Objective   /67   Pulse 88   Resp 18   Ht 182.9 cm (72\")   Wt 69 kg (152 lb 3.2 oz)   SpO2 98%   BMI 20.64 kg/m²     Estimated body mass index is 20.64 kg/m² as calculated from the following:    Height as of this encounter: 182.9 cm (72\").    Weight as of this encounter: 69 kg (152 lb 3.2 oz).     Physical Exam   GEN: In no acute distress, non toxic appearing  NEURO: AAO to person, place, and time. CN 2-12 intact grossly.   PSYCH: Affect normal, insight fair      Result Review :              Assessment and Plan     Diagnoses and all orders for this visit:    1. Crohn's disease with complication, unspecified gastrointestinal tract location (Primary)  Overall doing well with remicade / steroids.  Gaining weight and strength.  Continue to avoid coffee.  Continue regular exercise.  Continue f/u with GI.      2. S/P colon resection  See above    3. Generalized weakness  See above    4. Need for vaccination  Flu vaccine today.  Go to pharmacy for shingles vaccine.    -     Fluzone High-Dose 65+yrs ([id number])      Follow Up   Has wellness scheduled in December     "

## 2023-11-10 DIAGNOSIS — Z90.49 S/P COLON RESECTION: Chronic | ICD-10-CM

## 2023-11-10 RX ORDER — TRAMADOL HYDROCHLORIDE 50 MG/1
50 TABLET ORAL EVERY 12 HOURS PRN
Qty: 60 TABLET | Refills: 0 | Status: SHIPPED | OUTPATIENT
Start: 2023-11-10

## 2023-12-12 DIAGNOSIS — Z90.49 S/P COLON RESECTION: Chronic | ICD-10-CM

## 2023-12-12 RX ORDER — TRAMADOL HYDROCHLORIDE 50 MG/1
50 TABLET ORAL EVERY 12 HOURS PRN
Qty: 60 TABLET | Refills: 0 | Status: SHIPPED | OUTPATIENT
Start: 2023-12-12

## 2023-12-12 RX ORDER — TRAMADOL HYDROCHLORIDE 50 MG/1
50 TABLET ORAL EVERY 12 HOURS PRN
Qty: 60 TABLET | Refills: 0 | OUTPATIENT
Start: 2023-12-12

## 2023-12-27 DIAGNOSIS — Z90.49 S/P COLON RESECTION: Chronic | ICD-10-CM

## 2023-12-29 RX ORDER — TAMSULOSIN HYDROCHLORIDE 0.4 MG/1
1 CAPSULE ORAL DAILY
Qty: 90 CAPSULE | Refills: 0 | Status: SHIPPED | OUTPATIENT
Start: 2023-12-29

## 2023-12-29 RX ORDER — TRAMADOL HYDROCHLORIDE 50 MG/1
50 TABLET ORAL EVERY 12 HOURS PRN
Qty: 60 TABLET | Refills: 0 | Status: SHIPPED | OUTPATIENT
Start: 2023-12-29

## 2024-01-29 RX ORDER — FINASTERIDE 5 MG/1
5 TABLET, FILM COATED ORAL DAILY
Qty: 90 TABLET | Refills: 0 | Status: SHIPPED | OUTPATIENT
Start: 2024-01-29

## 2024-02-13 DIAGNOSIS — Z90.49 S/P COLON RESECTION: Chronic | ICD-10-CM

## 2024-02-14 RX ORDER — TRAMADOL HYDROCHLORIDE 50 MG/1
50 TABLET ORAL EVERY 12 HOURS PRN
Qty: 60 TABLET | Refills: 0 | Status: SHIPPED | OUTPATIENT
Start: 2024-02-14

## 2024-02-26 NOTE — TELEPHONE ENCOUNTER
Arthroplasty Total Hip Posterior Approach (L) Operative Note     Date: 2024  OR Location: [facility] OR    Name: Jesus Rand, : 1961, Age: 62 y.o., MRN: [id number], Sex: male    Diagnosis  Pre-op Diagnosis     * Osteoarthritis of left hip, unspecified osteoarthritis type [M16.12] Post-op Diagnosis     * Osteoarthritis of left hip, unspecified osteoarthritis type [M16.12]     Procedures  Arthroplasty Total Hip Posterior Approach  36236 - MO ARTHRP ACETBLR/PROX FEM PROSTC AGRFT/ALGRFT      Surgeons      * Alexis Stewart - Primary    Resident/Fellow/Other Assistant:  Surgeon(s) and Role:    Procedure Summary  Anesthesia: Consult  ASA: II  Anesthesia Staff: Anesthesiologist: Vj Wood MD  Estimated Blood Loss: 100 mL  Intra-op Medications:   Administrations occurring from 0730 to 1050 on 24:   Medication Name Total Dose   ropivacaine-epinephrine-clonidine-ketorolac 2.46-0.005- 0.0008-0.3mg/mL periarticular syringe 50 mL   gentamicin (Garamycin) 240 mg in sodium chloride 0.9 % 3,000 mL irrigation 240 mg   tranexamic acid (Lysteda) tablet 1,950 mg 1,950 mg              Anesthesia Record               Intraprocedure I/O Totals          Intake    Propofol Drip 0.00 mL    The total shown is the total volume documented since Anesthesia Start was filed.    Total Intake 0 mL          Specimen: No specimens collected     Staff:   Circulator: Kamaljit Trejo RN  Scrub Person: Lindsay Atwood RN; Marlin Kahn         Drains and/or Catheters: * None in log *    Tourniquet Times:         Implants:  Implants       Type Name Action Serial No.      Joint SHELL, TRIDENT II, CLUSTERHOLE, 54E - SN/A - [id number] Implanted N/A     Joint Hip LINER, COCR, MDM, 42MM E - SN/A - [id number] Implanted N/A     Joint STEM, FEMUR 132D SZ 4 ACCOLADE II - SN/A - [id number] Implanted N/A     Joint HEAD, FEMUR V40 28MM -2/7MM BIOLOX DELTA - SN/A - [id number] Implanted N/A     Joint INSERT, MDM X3 RESTORATION, 42OD 28ID X 6.9MM - SN/A -  Sent patient and his wife a message through My Chart regarding the referral to [facility] gave the phone for the patient to reach out to that office to call and get scheduled.    [id number] Implanted N/A              Findings:bone on bone with CAM deformity.    Indications: Jesus Rand is an 62 y.o. male who is having surgery for Osteoarthritis of left hip, unspecified osteoarthritis type [M16.12].       The patient was seen in the preoperative area. The risks, benefits, complications, treatment options, non-operative alternatives, expected recovery and outcomes were discussed with the patient. The possibilities of reaction to medication, pulmonary aspiration, injury to surrounding structures, bleeding, recurrent infection, the need for additional procedures, failure to diagnose a condition, and creating a complication requiring transfusion or operation were discussed with the patient. The patient concurred with the proposed plan, giving informed consent.  The site of surgery was properly noted/marked if necessary per policy. The patient has been actively warmed in preoperative area. Preoperative antibiotics have been ordered and given within 1 hours of incision. Venous thrombosis prophylaxis A unilateral pneumatic sequential stocking was used.    Procedure Details:       Pre-operative Diagnosis: Left Hip severe Osteoarthritis    Post-operative Diagnosis: Same    Procedure: Left total hip replacement with McGraws Duomobility implant-  #4  Femoral; 54mm Acetabular shell;  42 mm acetabular liner; and 28 mm zero neck ceramic head    Surgeon: Alexis Stewart MD     Assistant Surgeon: none    Surgical Assistant: Danette    Anesthesia: General    Estimated Blood Loss: 100cc    Antibiotic Prophylaxis: Vanco 1 gm  within 20 min of incision    Tourniquet Time: NA    Complications: None    Indications for Total Hip Arthroplasy:   The patient failed conservative management for Osteoarthritis of the hip. This often includes oral anti-inflammatory medications and activity modification. The patient had obvious findings of arthritis on pre-operative X-ray evaluation. The patient's physical exam revealed  tenderness over the groin and buttock especially with internal rotation. The patient had limitation of range of motion as well as an antalgic limp preoperatively. The level of hip pain was severe enough to compromise the patient's overall health due to lack of activity as well as lack of sleep.  Therefore, this patient is indicated for a left Total hip Replacement.    Informed Consent:   Patient was advised of the risks, benefits, alternatives and complications of a LEFT TOTAL HIP ARTHROPLASTY and the patient agreed to proceed. The patient was informed of the risk of DVT, infection, nerve and blood vessel injury. The patient understood that infection with a hip arthroplasty is a serious situation that may require further surgery and multiple visits to the operating room to eradicate the infection in addition to long term IV antibiotics. This would be followed with a very high probability of removal of the implants and possible need for revision surgery. The risks of hip instability and dislocation were discussed. I reviewed hip precautions with the patient and they agree to adhere to these.  The risks of anesthesia were discussed. The risks of instrument failure were outlined. The need for cemented fixation was discussed. Often, arthroplasty patients require blood transfusions intra-operative or post-operative.    Procedure: The patient was identified verbally and using their hospital ID tag.  The emily on the operative site which had been placed by the surgeon in the PACU was confirmed.  A surgical timeout was performed confirming and concurring this was the correct patient and correct side and this was in agreement with the signed consent form.  Appropriate pre-operative antibiotics had been delivered. All the equipment necessary for the proposed procedure was available. Any necessary blood replacement or products had been reserved. Medical issues affecting the surgery were discussed. A peg board was used for  positioning in the lateral decubitus position. The patient´s positioning was secured, and all bony prominences and superficial nerves were inspected to be well padded and free of compression. All present in the operating room were polled and agreed to proceed.    The non-operative side was dependent, and the operative hip was exposed to the surgeon. The patient was appropriately sterilely prepped and draped. Surgical landmarks were outlined on the skin with an appropriate marker and the surgical incision was outlined. An Ioban dressing was applied to the skin.     A curvilinear skin incision was made with a #10 blade scalpel.  This was taken from the posterior superior iliac spine across the posterior one-third of the greater trochanter and along the femoral shaft.  This was approximately 18 centimeters.  The subcutaneous fat was opened with Bovie electrocautery and hemostasis was obtained.  The posterior fascia of the Gluteus Frantz was identified. The tensor facsia kavon was identified. This was incised sharply with the scalpel just distal to the tensor fascia muscle.  The surgeon´s finger was used to palpate the Gluteus Frantz insertion into the femur.  Once it was confirmed that the incision was anterior to the Gluteus Frantz insertion the fascia kavon was opened coincident with the skin incision. The external fascia of the gluteus frantz was opened as well. The muscle was bluntly divided. Care was taken to control any perforating vessels.  A Charnley retractor was placed opening the two limbs of the fascial split. Care was taken to identify and protect the sciatic nerve in this endeavor.     The femur was internally rotated.  The short external rotators, the piriformis tendon and the quadratus femorus were identified. The tendons for the gluteus medius and minimus were identified and using a periosteal elevator removed from the posterior capsule of the hip. A blunt Cobra retractor was placed around the  femoral neck to expose this and retract those tendons. The tendon of the piriformis was tagged with suture and the piriformis was detached from its fossa with Bovie electrocautery. The other short external rotators were similarly tagged and detached. These were used to further protect the sciatic nerve. The quadratus femoris was detached in its upper one-third as needed to expose the posterior capsule. The capsule was incised in a T shape at the base of the femoral head and extending down to the acetabulum. The lessor trochanter was identified and easily palpable. The hip was then dislocated.  The femoral neck inclination was marked with Bovie, and a surgical saw was used to cut the femoral neck and remove the head. The femoral head was measured on the back table. The acetabulum was cleared of soft tissue with Bovie electrocautery. The ligamentum teres was removed with Bovie electrocautery. An anterior acetabular retractor was carefully placed to help retract the proximal femur and expose the acetabulum circumferentially. Any overhanging labrum was removed. A posterior and inferior retractor was placed. The adequately exposed the acetabulum. The pulvinar was cleared and a reamer was used to deepen this to allow for medialization of the acetabular implant. Sequential reamers were then used up to the final reamer of 51 mm which was the appropriate one to allow for rim to rim fit. A trial was used to confirm the reaming was adequate and to confirm the orientation of the implant. Some osteophytes were removed anterior and inferiorly.  Some direct posterior osteophytes were removed as well. The actual implant was then impacted in appropriate anteversion and lateral tilt. The acetabular was checked for orientation and stability. A superior hole was drilled, measured and a screw was placed. Excellent purchase was obtained. The acetabular liner was placed. A sponge was placed into the acetabulum to protect it during the  femoral preparation.     The opening of the femoral canal was determined, and a canal finder was gently placed into the femoral shaft. The anteversion desired was determined. A canal finder was used. The shaft was sequentially broached until good fit and fill was obtained. Trial reductions were performed. The femoral length was restored. The stability was tested with flexion to >90, Adduction to >45 and IR to >45.  The hip remained stable.  The leg lengths were equal, the hip was stable, and the shuck test had appropriate stability as well.    At this point the actual implants were chosen. The femoral component was inserted until it was at the correct level and determined to be stable.  A trial reduction was attempted, and the actual head size and neck length was finalized. These were then placed on the femoral trunnion and secured by malleting them in place. The hip was reduced and found to be stable with good leg lengths and appropriate motion and stability.  The wound was copiously irrigated with antibiotic irrigation. The capsule was closed with #2 Ethibond in a locking fashion. The capsule and piriformis tendon tag sutures along with the other external rotators were brought through drill holes in the greater trochanter and secured.  The fascia Brenda was closed with 2 Ethibond figure of eight interrupted sutures. The gluteus fascia was closed with 1 vicryl running suture. The wound was irrigated again with antibiotic irrigation. The subcutaneous fat was approximated with 3-0 Vicryl simple sutures. The subcutaneous tissues were approximated with 3-0 Vicryl simple sutures. The skin was closed with steri-strips. A dry sterile self- suctioning JASPAL dressing was applied All counts were correct at the end of the procedure. The patient tolerated the procedure well and returned to the recovery room in stable condition.    Complications:  None; patient tolerated the procedure well.    Disposition: PACU - hemodynamically  stable.  Condition: stable         Additional Details:     Attending Attestation: I was present and scrubbed for the entire procedure.    Alexis Stewart  Phone Number: [phone]       Subsequent Stages Histo Method Verbiage: Using a similar technique to that described above, a thin layer of tissue was removed from all areas where tumor was visible on the previous stage.  The tissue was again oriented, mapped, dyed, and processed as above.

## 2024-05-09 RX ORDER — FINASTERIDE 5 MG/1
5 TABLET, FILM COATED ORAL DAILY
Qty: 90 TABLET | Refills: 0 | Status: SHIPPED | OUTPATIENT
Start: 2024-05-09

## 2024-06-03 DIAGNOSIS — Z90.49 S/P COLON RESECTION: Chronic | ICD-10-CM

## 2024-06-04 RX ORDER — TRAMADOL HYDROCHLORIDE 50 MG/1
50 TABLET ORAL EVERY 12 HOURS PRN
Qty: 60 TABLET | Refills: 0 | Status: SHIPPED | OUTPATIENT
Start: 2024-06-04

## 2024-07-02 ENCOUNTER — LAB (OUTPATIENT)
Dept: FAMILY MEDICINE CLINIC | Facility: CLINIC | Age: 74
End: 2024-07-02
Payer: MEDICARE

## 2024-07-02 ENCOUNTER — OFFICE VISIT (OUTPATIENT)
Dept: FAMILY MEDICINE CLINIC | Facility: CLINIC | Age: 74
End: 2024-07-02
Payer: MEDICARE

## 2024-07-02 VITALS
DIASTOLIC BLOOD PRESSURE: 63 MMHG | RESPIRATION RATE: 18 BRPM | WEIGHT: 155 LBS | SYSTOLIC BLOOD PRESSURE: 123 MMHG | OXYGEN SATURATION: 97 % | BODY MASS INDEX: 20.99 KG/M2 | HEIGHT: 72 IN | HEART RATE: 88 BPM

## 2024-07-02 DIAGNOSIS — Z00.00 MEDICARE ANNUAL WELLNESS VISIT, SUBSEQUENT: Primary | ICD-10-CM

## 2024-07-02 DIAGNOSIS — Z12.5 PROSTATE CANCER SCREENING: ICD-10-CM

## 2024-07-02 DIAGNOSIS — I10 ESSENTIAL HYPERTENSION: ICD-10-CM

## 2024-07-02 DIAGNOSIS — F39 MOOD DISORDER: ICD-10-CM

## 2024-07-02 LAB
BASOPHILS # BLD AUTO: 0.02 10*3/MM3 (ref 0–0.2)
BASOPHILS NFR BLD AUTO: 0.3 % (ref 0–1.5)
DEPRECATED RDW RBC AUTO: 48.4 FL (ref 37–54)
EOSINOPHIL # BLD AUTO: 0.05 10*3/MM3 (ref 0–0.4)
EOSINOPHIL NFR BLD AUTO: 0.7 % (ref 0.3–6.2)
ERYTHROCYTE [DISTWIDTH] IN BLOOD BY AUTOMATED COUNT: 18.5 % (ref 12.3–15.4)
HCT VFR BLD AUTO: 33.3 % (ref 37.5–51)
HGB BLD-MCNC: 9.5 G/DL (ref 13–17.7)
IMM GRANULOCYTES # BLD AUTO: 0.02 10*3/MM3 (ref 0–0.05)
IMM GRANULOCYTES NFR BLD AUTO: 0.3 % (ref 0–0.5)
LYMPHOCYTES # BLD AUTO: 1.51 10*3/MM3 (ref 0.7–3.1)
LYMPHOCYTES NFR BLD AUTO: 20.7 % (ref 19.6–45.3)
MCH RBC QN AUTO: 21 PG (ref 26.6–33)
MCHC RBC AUTO-ENTMCNC: 28.5 G/DL (ref 31.5–35.7)
MCV RBC AUTO: 73.5 FL (ref 79–97)
MONOCYTES # BLD AUTO: 0.57 10*3/MM3 (ref 0.1–0.9)
MONOCYTES NFR BLD AUTO: 7.8 % (ref 5–12)
NEUTROPHILS NFR BLD AUTO: 5.13 10*3/MM3 (ref 1.7–7)
NEUTROPHILS NFR BLD AUTO: 70.2 % (ref 42.7–76)
PLATELET # BLD AUTO: 379 10*3/MM3 (ref 140–450)
PMV BLD AUTO: 8.5 FL (ref 6–12)
RBC # BLD AUTO: 4.53 10*6/MM3 (ref 4.14–5.8)
WBC NRBC COR # BLD AUTO: 7.3 10*3/MM3 (ref 3.4–10.8)

## 2024-07-02 PROCEDURE — 85025 COMPLETE CBC W/AUTO DIFF WBC: CPT | Performed by: STUDENT IN AN ORGANIZED HEALTH CARE EDUCATION/TRAINING PROGRAM

## 2024-07-02 PROCEDURE — 1160F RVW MEDS BY RX/DR IN RCRD: CPT | Performed by: STUDENT IN AN ORGANIZED HEALTH CARE EDUCATION/TRAINING PROGRAM

## 2024-07-02 PROCEDURE — 80053 COMPREHEN METABOLIC PANEL: CPT | Performed by: STUDENT IN AN ORGANIZED HEALTH CARE EDUCATION/TRAINING PROGRAM

## 2024-07-02 PROCEDURE — 3078F DIAST BP <80 MM HG: CPT | Performed by: STUDENT IN AN ORGANIZED HEALTH CARE EDUCATION/TRAINING PROGRAM

## 2024-07-02 PROCEDURE — 1125F AMNT PAIN NOTED PAIN PRSNT: CPT | Performed by: STUDENT IN AN ORGANIZED HEALTH CARE EDUCATION/TRAINING PROGRAM

## 2024-07-02 PROCEDURE — 1170F FXNL STATUS ASSESSED: CPT | Performed by: STUDENT IN AN ORGANIZED HEALTH CARE EDUCATION/TRAINING PROGRAM

## 2024-07-02 PROCEDURE — 80061 LIPID PANEL: CPT | Performed by: STUDENT IN AN ORGANIZED HEALTH CARE EDUCATION/TRAINING PROGRAM

## 2024-07-02 PROCEDURE — 96160 PT-FOCUSED HLTH RISK ASSMT: CPT | Performed by: STUDENT IN AN ORGANIZED HEALTH CARE EDUCATION/TRAINING PROGRAM

## 2024-07-02 PROCEDURE — 99213 OFFICE O/P EST LOW 20 MIN: CPT | Performed by: STUDENT IN AN ORGANIZED HEALTH CARE EDUCATION/TRAINING PROGRAM

## 2024-07-02 PROCEDURE — G0103 PSA SCREENING: HCPCS | Performed by: STUDENT IN AN ORGANIZED HEALTH CARE EDUCATION/TRAINING PROGRAM

## 2024-07-02 PROCEDURE — 3074F SYST BP LT 130 MM HG: CPT | Performed by: STUDENT IN AN ORGANIZED HEALTH CARE EDUCATION/TRAINING PROGRAM

## 2024-07-02 PROCEDURE — 83036 HEMOGLOBIN GLYCOSYLATED A1C: CPT | Performed by: STUDENT IN AN ORGANIZED HEALTH CARE EDUCATION/TRAINING PROGRAM

## 2024-07-02 PROCEDURE — G0439 PPPS, SUBSEQ VISIT: HCPCS | Performed by: STUDENT IN AN ORGANIZED HEALTH CARE EDUCATION/TRAINING PROGRAM

## 2024-07-02 PROCEDURE — 36415 COLL VENOUS BLD VENIPUNCTURE: CPT | Performed by: STUDENT IN AN ORGANIZED HEALTH CARE EDUCATION/TRAINING PROGRAM

## 2024-07-02 PROCEDURE — 84443 ASSAY THYROID STIM HORMONE: CPT | Performed by: STUDENT IN AN ORGANIZED HEALTH CARE EDUCATION/TRAINING PROGRAM

## 2024-07-02 PROCEDURE — 1159F MED LIST DOCD IN RCRD: CPT | Performed by: STUDENT IN AN ORGANIZED HEALTH CARE EDUCATION/TRAINING PROGRAM

## 2024-07-02 NOTE — PROGRESS NOTES
The ABCs of the Annual Wellness Visit  Subsequent Medicare Wellness Visit    Subjective    Pramod Trent is a 74 y.o. male who presents for a Subsequent Medicare Wellness Visit.    The following portions of the patient's history were reviewed and   updated as appropriate: allergies, current medications, past family history, past medical history, past social history, past surgical history, and problem list.    Compared to one year ago, the patient feels his physical   health is better.    Compared to one year ago, the patient feels his mental   health is better.    Recent Hospitalizations:  He was not admitted to the hospital during the last year.       Current Medical Providers:  Patient Care Team:  Geovanny Wang DO as PCP - General (Family Medicine)    Outpatient Medications Prior to Visit   Medication Sig Dispense Refill    finasteride (PROSCAR) 5 MG tablet Take 1 tablet by mouth once daily 90 tablet 0    Remicade 100 MG injection Infuse  into a venous catheter Every 2 (Two) Months.      tamsulosin (FLOMAX) 0.4 MG capsule 24 hr capsule Take 1 capsule by mouth Daily. 90 capsule 0    traMADol (ULTRAM) 50 MG tablet TAKE 1 TABLET BY MOUTH EVERY 12 HOURS AS NEEDED FOR MODERATE PAIN 60 tablet 0    cholestyramine light 4 g packet Take 1 packet by mouth 4 (Four) Times a Day. (Patient not taking: Reported on 7/2/2024) 120 packet 0    hydrOXYzine (ATARAX) 25 MG tablet Take 1 tablet by mouth Every 8 (Eight) Hours As Needed for Anxiety. (Patient not taking: Reported on 7/2/2024) 60 tablet 2    ketoconazole (NIZORAL) 2 % shampoo Apply  topically to the appropriate area as directed 2 (Two) Times a Week. (Patient not taking: Reported on 7/2/2024) 1 each 6    Lactobacillus tablet Take 1 tablet by mouth Daily. (Patient not taking: Reported on 7/2/2024) 100 tablet 1    ondansetron (ZOFRAN) 4 MG tablet Take 1 tablet by mouth Every 6 (Six) Hours As Needed. (Patient not taking: Reported on 7/2/2024)      QUEtiapine (SEROquel)  25 MG tablet TAKE 1 TABLET BY MOUTH ONCE DAILY AT NIGHT (Patient not taking: Reported on 7/2/2024) 30 tablet 0    QUEtiapine (SEROquel) 25 MG tablet Take 1 tablet by mouth Every Night. (Patient not taking: Reported on 7/2/2024) 30 tablet 0     No facility-administered medications prior to visit.       Opioid medication/s are on active medication list.  and I have evaluated his active treatment plan and pain score trends (see table).  There were no vitals filed for this visit.  I have reviewed the chart for potential of high risk medication and harmful drug interactions in the elderly.          Aspirin is not on active medication list.  Aspirin use is not indicated based on review of current medical condition/s. Risk of harm outweighs potential benefits.  .    Patient Active Problem List   Diagnosis    Post-traumatic osteoarthritis of one knee, right    DDD (degenerative disc disease), cervical    Erectile dysfunction due to diseases classified elsewhere    Chronic pain syndrome    Anxiety    Need for immunization against influenza    Preventative health care    Essential hypertension    Alcohol overdose    Sciatic nerve pain, right    Screening for prostate cancer    Sciatic nerve pain, left    Functional diarrhea    Alcoholism    Weight loss    Chronic hypokalemia    Small bowel obstruction    Left foot drop    Acute diarrhea    JOSIE (acute kidney injury)    Enterocolitis    History of ileus    Normocytic anemia    Unintentional weight loss    Colitis    History of alcohol abuse    S/P colon resection    Medicare annual wellness visit, subsequent    Thrush, oral    Abnormal findings on diagnostic imaging of other parts of digestive tract    Back pain    Bladder wall thickening    Colostomy hemorrhage    Diarrhea    Hand abscess     Advance Care Planning   Advance Care Planning     Advance Directive is not on file.  ACP discussion was held with the patient during this visit. Patient does not have an advance  "directive, information provided.     Objective    Vitals:    24 1415   BP: 123/63   Pulse: 88   Resp: 18   SpO2: 97%   Weight: 70.3 kg (155 lb)   Height: 182.9 cm (72\")     Estimated body mass index is 21.02 kg/m² as calculated from the following:    Height as of this encounter: 182.9 cm (72\").    Weight as of this encounter: 70.3 kg (155 lb).    BMI is within normal parameters. No other follow-up for BMI required.      Does the patient have evidence of cognitive impairment? No          HEALTH RISK ASSESSMENT    Smoking Status:  Social History     Tobacco Use   Smoking Status Former    Current packs/day: 0.00    Average packs/day: 1 pack/day for 30.0 years (30.0 ttl pk-yrs)    Types: Cigarettes    Start date: 1972    Quit date: 2002    Years since quittin.5    Passive exposure: Past   Smokeless Tobacco Former    Types: Chew     Alcohol Consumption:  Social History     Substance and Sexual Activity   Alcohol Use Not Currently     Fall Risk Screen:    TEODOROADI Fall Risk Assessment was completed, and patient is at LOW risk for falls.Assessment completed on:2024    Depression Screenin/2/2024     2:20 PM   PHQ-2/PHQ-9 Depression Screening   Little Interest or Pleasure in Doing Things 0-->not at all   Feeling Down, Depressed or Hopeless 0-->not at all   PHQ-9: Brief Depression Severity Measure Score 0       Health Habits and Functional and Cognitive Screenin/2/2024     2:21 PM   Functional & Cognitive Status   Do you have difficulty preparing food and eating? No   Do you have difficulty bathing yourself, getting dressed or grooming yourself? No   Do you have difficulty using the toilet? No   Do you have difficulty moving around from place to place? No   Do you have trouble with steps or getting out of a bed or a chair? No   Current Diet Well Balanced Diet   Dental Exam Not up to date   Eye Exam Not up to date   Exercise (times per week) 7 times per week   Current Exercises Include " Yard Work;Other   Do you need help using the phone?  No   Are you deaf or do you have serious difficulty hearing?  No   Do you need help to go to places out of walking distance? No   Do you need help shopping? No   Do you need help preparing meals?  No   Do you need help with housework?  No   Do you need help with laundry? No   Do you need help taking your medications? No   Do you need help managing money? No   Do you ever drive or ride in a car without wearing a seat belt? No   Have you felt unusual stress, anger or loneliness in the last month? No   Who do you live with? Spouse   If you need help, do you have trouble finding someone available to you? No   Have you been bothered in the last four weeks by sexual problems? No   Do you have difficulty concentrating, remembering or making decisions? No       Age-appropriate Screening Schedule:  Refer to the list below for future screening recommendations based on patient's age, sex and/or medical conditions. Orders for these recommended tests are listed in the plan section. The patient has been provided with a written plan.    Health Maintenance   Topic Date Due    ZOSTER VACCINE (1 of 2) Never done    RSV Vaccine - Adults (1 - 1-dose 60+ series) Never done    HEPATITIS C SCREENING  Never done    COVID-19 Vaccine (3 - 2023-24 season) 09/01/2023    INFLUENZA VACCINE  08/01/2024    ANNUAL WELLNESS VISIT  07/02/2025    TDAP/TD VACCINES (2 - Td or Tdap) 03/18/2031    COLORECTAL CANCER SCREENING  06/07/2033    Pneumococcal Vaccine 65+  Completed    AAA SCREEN (ONE-TIME)  Completed    LUNG CANCER SCREENING  Discontinued                  CMS Preventative Services Quick Reference  Risk Factors Identified During Encounter  Immunizations Discussed/Encouraged: Shingrix and RSV (Respiratory Syncytial Virus)  The above risks/problems have been discussed with the patient.  Pertinent information has been shared with the patient in the After Visit Summary.  An After Visit Summary and  "PPPS were made available to the patient.    Follow Up:   Next Medicare Wellness visit to be scheduled in 1 year.       Additional E&M Note during same encounter follows:  Patient has multiple medical problems which are significant and separately identifiable that require additional work above and beyond the Medicare Wellness Visit.      Chief Complaint  Medicare Wellness-subsequent    Subjective        HPI  His mood has been off recently.  He is very irritable.  His wife says he \"bites heads off\".  Much of his frustrations have to do with his health concerns.    He was previously prescribed seroquel but either didn't like it or didn't want to take it.      Appetite is good.  Still has colostomy bag, seeing GI, on remicade q2 months.    The colostomy bag is very aggravating for him.    Objective   Vital Signs:  /63   Pulse 88   Resp 18   Ht 182.9 cm (72\")   Wt 70.3 kg (155 lb)   SpO2 97%   BMI 21.02 kg/m²     GEN: In no acute distress, non toxic appearing  HEENT: Pupils equal and reactive to light, sclera clear. Mucous membranes moist. Oropharynx without erythema or exudate. No cervical or submandibular lymphadenopathy.  Bilateral TM's wnl.    CV: Regular rate and rhythm, no murmurs, 2+ peripheral pulses, No extremity edema.   RESP: Lungs clear to auscultation anteriorly and posteriorly in all lung fields bilaterally.  NEURO: AAO to person, place, and time. CN 2-12 intact grossly.  PSYCH: Affect normal, insight fair              Assessment and Plan   Diagnoses and all orders for this visit:    1. Medicare annual wellness visit, subsequent (Primary)  Overall reassuring exam.  BP at goal today.  Check blood work as below.  Encourage regular physical activity.  Encourage healthy diet w/ consideration of safe foods for crohn's.    Keep f/u with specialists.  Continue current medication management.  PSA for prostate ca screening.  Next wellness in 1yr.    -     CBC Auto Differential  -     Comprehensive " Metabolic Panel  -     Hemoglobin A1c  -     Lipid Panel  -     TSH    2. Essential hypertension  BP at goal today.    3. Prostate cancer screening  -     PSA Screen    4. Mood disorder  Trial sertraline 50 mg daily.  Discussed that it can take 4-6 wks to see maximum effect.    F/u 6-8 wks.    -     sertraline (Zoloft) 50 MG tablet; Take 1 tablet by mouth Daily.  Dispense: 90 tablet; Refill: 1      Follow Up   Return in about 6 weeks (around 8/13/2024) for mood f/u .  Patient was given instructions and counseling regarding his condition or for health maintenance advice. Please see specific information pulled into the AVS if appropriate.

## 2024-07-03 ENCOUNTER — TELEPHONE (OUTPATIENT)
Dept: FAMILY MEDICINE CLINIC | Facility: CLINIC | Age: 74
End: 2024-07-03
Payer: MEDICARE

## 2024-07-03 LAB
ALBUMIN SERPL-MCNC: 3.7 G/DL (ref 3.5–5.2)
ALBUMIN/GLOB SERPL: 1.2 G/DL
ALP SERPL-CCNC: 83 U/L (ref 39–117)
ALT SERPL W P-5'-P-CCNC: 18 U/L (ref 1–41)
ANION GAP SERPL CALCULATED.3IONS-SCNC: 8 MMOL/L (ref 5–15)
AST SERPL-CCNC: 20 U/L (ref 1–40)
BILIRUB SERPL-MCNC: 0.3 MG/DL (ref 0–1.2)
BUN SERPL-MCNC: 25 MG/DL (ref 8–23)
BUN/CREAT SERPL: 35.2 (ref 7–25)
CALCIUM SPEC-SCNC: 9.3 MG/DL (ref 8.6–10.5)
CHLORIDE SERPL-SCNC: 104 MMOL/L (ref 98–107)
CHOLEST SERPL-MCNC: 134 MG/DL (ref 0–200)
CO2 SERPL-SCNC: 25 MMOL/L (ref 22–29)
CREAT SERPL-MCNC: 0.71 MG/DL (ref 0.76–1.27)
EGFRCR SERPLBLD CKD-EPI 2021: 96.9 ML/MIN/1.73
GLOBULIN UR ELPH-MCNC: 3.2 GM/DL
GLUCOSE SERPL-MCNC: 84 MG/DL (ref 65–99)
HBA1C MFR BLD: 5.5 % (ref 4.8–5.6)
HDLC SERPL-MCNC: 47 MG/DL (ref 40–60)
LDLC SERPL CALC-MCNC: 77 MG/DL (ref 0–100)
LDLC/HDLC SERPL: 1.68 {RATIO}
POTASSIUM SERPL-SCNC: 4 MMOL/L (ref 3.5–5.2)
PROT SERPL-MCNC: 6.9 G/DL (ref 6–8.5)
PSA SERPL-MCNC: 1.09 NG/ML (ref 0–4)
SODIUM SERPL-SCNC: 137 MMOL/L (ref 136–145)
TRIGL SERPL-MCNC: 40 MG/DL (ref 0–150)
TSH SERPL DL<=0.05 MIU/L-ACNC: 1.65 UIU/ML (ref 0.27–4.2)
VLDLC SERPL-MCNC: 10 MG/DL (ref 5–40)

## 2024-07-03 NOTE — TELEPHONE ENCOUNTER
Name: Pramod Trent    Relationship: Self    Best Callback Number: [phone]     HUB PROVIDED THE RELAY MESSAGE FROM THE OFFICE   PATIENT VOICED UNDERSTANDING AND HAS NO FURTHER QUESTIONS AT THIS TIME

## 2024-07-03 NOTE — TELEPHONE ENCOUNTER
HUB to share. LM to return call.   ----- Message from Geovanny Wang sent at 7/3/2024 12:35 PM EDT -----  Please let Mr. Trent know his labs all came back reassuring.  His hemoglobin is stable.  Kidney function, liver function, blood sugar, thyroid function, and cholesterol all looked good.  His cholesterol looked great.  All good news.  We'll see him back in August unless he needs something before then.

## 2024-07-15 DIAGNOSIS — Z90.49 S/P COLON RESECTION: Chronic | ICD-10-CM

## 2024-07-15 RX ORDER — TAMSULOSIN HYDROCHLORIDE 0.4 MG/1
1 CAPSULE ORAL DAILY
Qty: 30 CAPSULE | Refills: 0 | Status: SHIPPED | OUTPATIENT
Start: 2024-07-15

## 2024-07-15 RX ORDER — TRAMADOL HYDROCHLORIDE 50 MG/1
50 TABLET ORAL EVERY 12 HOURS PRN
Qty: 60 TABLET | Refills: 0 | Status: SHIPPED | OUTPATIENT
Start: 2024-07-15

## 2024-07-15 RX ORDER — DICLOFENAC SODIUM 75 MG/1
75 TABLET, DELAYED RELEASE ORAL 2 TIMES DAILY
Qty: 60 TABLET | Refills: 0 | Status: SHIPPED | OUTPATIENT
Start: 2024-07-15

## 2024-08-15 DIAGNOSIS — Z90.49 S/P COLON RESECTION: Chronic | ICD-10-CM

## 2024-08-15 RX ORDER — DICLOFENAC SODIUM 75 MG/1
75 TABLET, DELAYED RELEASE ORAL 2 TIMES DAILY
Qty: 60 TABLET | Refills: 0 | Status: SHIPPED | OUTPATIENT
Start: 2024-08-15

## 2024-08-16 RX ORDER — TAMSULOSIN HYDROCHLORIDE 0.4 MG/1
1 CAPSULE ORAL DAILY
Qty: 90 CAPSULE | Refills: 3 | Status: SHIPPED | OUTPATIENT
Start: 2024-08-16

## 2024-08-16 RX ORDER — TRAMADOL HYDROCHLORIDE 50 MG/1
50 TABLET ORAL EVERY 12 HOURS PRN
Qty: 60 TABLET | Refills: 0 | Status: SHIPPED | OUTPATIENT
Start: 2024-08-16

## 2024-09-16 ENCOUNTER — TELEPHONE (OUTPATIENT)
Dept: FAMILY MEDICINE CLINIC | Facility: CLINIC | Age: 74
End: 2024-09-16

## 2024-09-16 ENCOUNTER — OFFICE VISIT (OUTPATIENT)
Dept: FAMILY MEDICINE CLINIC | Facility: CLINIC | Age: 74
End: 2024-09-16
Payer: MEDICARE

## 2024-09-16 VITALS
WEIGHT: 152.8 LBS | HEART RATE: 74 BPM | OXYGEN SATURATION: 98 % | BODY MASS INDEX: 20.7 KG/M2 | HEIGHT: 72 IN | SYSTOLIC BLOOD PRESSURE: 118 MMHG | DIASTOLIC BLOOD PRESSURE: 58 MMHG | RESPIRATION RATE: 18 BRPM

## 2024-09-16 DIAGNOSIS — G89.29 OTHER CHRONIC PAIN: ICD-10-CM

## 2024-09-16 DIAGNOSIS — N40.1 BENIGN PROSTATIC HYPERPLASIA WITH URINARY FREQUENCY: ICD-10-CM

## 2024-09-16 DIAGNOSIS — F39 MOOD DISORDER: Primary | ICD-10-CM

## 2024-09-16 DIAGNOSIS — F51.01 PRIMARY INSOMNIA: ICD-10-CM

## 2024-09-16 DIAGNOSIS — R35.0 BENIGN PROSTATIC HYPERPLASIA WITH URINARY FREQUENCY: ICD-10-CM

## 2024-09-16 PROCEDURE — 1125F AMNT PAIN NOTED PAIN PRSNT: CPT | Performed by: STUDENT IN AN ORGANIZED HEALTH CARE EDUCATION/TRAINING PROGRAM

## 2024-09-16 PROCEDURE — 3074F SYST BP LT 130 MM HG: CPT | Performed by: STUDENT IN AN ORGANIZED HEALTH CARE EDUCATION/TRAINING PROGRAM

## 2024-09-16 PROCEDURE — 1159F MED LIST DOCD IN RCRD: CPT | Performed by: STUDENT IN AN ORGANIZED HEALTH CARE EDUCATION/TRAINING PROGRAM

## 2024-09-16 PROCEDURE — 99214 OFFICE O/P EST MOD 30 MIN: CPT | Performed by: STUDENT IN AN ORGANIZED HEALTH CARE EDUCATION/TRAINING PROGRAM

## 2024-09-16 PROCEDURE — 1160F RVW MEDS BY RX/DR IN RCRD: CPT | Performed by: STUDENT IN AN ORGANIZED HEALTH CARE EDUCATION/TRAINING PROGRAM

## 2024-09-16 PROCEDURE — 3078F DIAST BP <80 MM HG: CPT | Performed by: STUDENT IN AN ORGANIZED HEALTH CARE EDUCATION/TRAINING PROGRAM

## 2024-09-16 RX ORDER — DICLOFENAC SODIUM 75 MG/1
75 TABLET, DELAYED RELEASE ORAL 2 TIMES DAILY
Qty: 60 TABLET | Refills: 2 | Status: SHIPPED | OUTPATIENT
Start: 2024-09-16

## 2024-09-16 RX ORDER — TEMAZEPAM 15 MG/1
15 CAPSULE ORAL NIGHTLY PRN
Qty: 30 CAPSULE | Refills: 5 | Status: SHIPPED | OUTPATIENT
Start: 2024-09-16

## 2024-09-16 RX ORDER — FINASTERIDE 5 MG/1
5 TABLET, FILM COATED ORAL DAILY
Qty: 90 TABLET | Refills: 0 | Status: SHIPPED | OUTPATIENT
Start: 2024-09-16

## 2024-09-16 RX ORDER — TRAMADOL HYDROCHLORIDE 50 MG/1
50 TABLET ORAL EVERY 12 HOURS PRN
Qty: 60 TABLET | Refills: 0 | Status: SHIPPED | OUTPATIENT
Start: 2024-09-16

## 2024-09-16 RX ORDER — FINASTERIDE 5 MG/1
5 TABLET, FILM COATED ORAL DAILY
Qty: 90 TABLET | Refills: 3 | Status: SHIPPED | OUTPATIENT
Start: 2024-09-16

## 2024-09-16 RX ORDER — SERTRALINE HYDROCHLORIDE 100 MG/1
100 TABLET, FILM COATED ORAL DAILY
Qty: 90 TABLET | Refills: 3 | Status: SHIPPED | OUTPATIENT
Start: 2024-09-16

## 2024-09-16 NOTE — TELEPHONE ENCOUNTER
Pharmacy Name: [facility] [facility] - [phone]  - [phone]      Pharmacy representative name: MANN    Pharmacy representative phone number: [phone]     What medication are you calling in regards to:     temazepam (RESTORIL) 15 MG capsule   traMADol (ULTRAM) 50 MG tablet     What question does the pharmacy have: POTENTIAL DRUG INTERACTION THAT CAN LEAD TO RESPIRATORY FAILURE. PLEASE CALL TO DISCUSS FURTHER    Who is the provider that prescribed the medication: DR LONGO

## 2024-10-21 DIAGNOSIS — G89.29 OTHER CHRONIC PAIN: ICD-10-CM

## 2024-10-21 RX ORDER — TRAMADOL HYDROCHLORIDE 50 MG/1
50 TABLET ORAL EVERY 12 HOURS PRN
Qty: 60 TABLET | Refills: 0 | Status: SHIPPED | OUTPATIENT
Start: 2024-10-21

## 2024-11-20 DIAGNOSIS — G89.29 OTHER CHRONIC PAIN: ICD-10-CM

## 2024-11-20 RX ORDER — TRAMADOL HYDROCHLORIDE 50 MG/1
50 TABLET ORAL EVERY 12 HOURS PRN
Qty: 60 TABLET | Refills: 0 | Status: SHIPPED | OUTPATIENT
Start: 2024-11-20

## 2024-12-14 DIAGNOSIS — G89.29 OTHER CHRONIC PAIN: ICD-10-CM

## 2024-12-16 RX ORDER — TRAMADOL HYDROCHLORIDE 50 MG/1
50 TABLET ORAL EVERY 12 HOURS PRN
Qty: 60 TABLET | Refills: 0 | Status: SHIPPED | OUTPATIENT
Start: 2024-12-16

## 2025-01-26 DIAGNOSIS — G89.29 OTHER CHRONIC PAIN: ICD-10-CM

## 2025-01-27 RX ORDER — TRAMADOL HYDROCHLORIDE 50 MG/1
50 TABLET ORAL EVERY 12 HOURS PRN
Qty: 60 TABLET | Refills: 0 | Status: SHIPPED | OUTPATIENT
Start: 2025-01-27

## 2025-01-31 ENCOUNTER — TELEPHONE (OUTPATIENT)
Dept: FAMILY MEDICINE CLINIC | Facility: CLINIC | Age: 75
End: 2025-01-31
Payer: MEDICARE

## 2025-01-31 DIAGNOSIS — G89.29 OTHER CHRONIC PAIN: ICD-10-CM

## 2025-02-03 RX ORDER — DICLOFENAC SODIUM 75 MG/1
75 TABLET, DELAYED RELEASE ORAL 2 TIMES DAILY
Qty: 60 TABLET | Refills: 0 | Status: SHIPPED | OUTPATIENT
Start: 2025-02-03

## 2025-02-03 NOTE — TELEPHONE ENCOUNTER
Caller: Pramod Trent    Relationship: Self    Best call back number: [phone]    Requested Prescriptions:   Requested Prescriptions     Pending Prescriptions Disp Refills    diclofenac (VOLTAREN) 75 MG EC tablet [Pharmacy Med Name: Diclofenac Sodium 75 MG Oral Tablet Delayed Release] 60 tablet 0     Sig: Take 1 tablet by mouth twice daily        Pharmacy where request should be sent: [address] [phone] [facility] [phone]      Last office visit with prescribing clinician: 9/16/2024   Last telemedicine visit with prescribing clinician: Visit date not found   Next office visit with prescribing clinician: 3/17/2025     Additional details provided by patient: PATIENT COMPLETELY OUT    Does the patient have less than a 3 day supply:  [x] Yes  [] No    Would you like a call back once the refill request has been completed: [] Yes [x] No    If the office needs to give you a call back, can they leave a voicemail: [] Yes [x] No    Beckie Blas Rep   02/03/25 13:28 EST

## 2025-02-25 DIAGNOSIS — G89.29 OTHER CHRONIC PAIN: ICD-10-CM

## 2025-02-25 RX ORDER — TRAMADOL HYDROCHLORIDE 50 MG/1
50 TABLET ORAL EVERY 12 HOURS PRN
Qty: 60 TABLET | Refills: 0 | Status: SHIPPED | OUTPATIENT
Start: 2025-02-25

## 2025-02-27 DIAGNOSIS — G89.29 OTHER CHRONIC PAIN: ICD-10-CM

## 2025-02-27 RX ORDER — DICLOFENAC SODIUM 75 MG/1
75 TABLET, DELAYED RELEASE ORAL 2 TIMES DAILY
Qty: 60 TABLET | Refills: 0 | Status: SHIPPED | OUTPATIENT
Start: 2025-02-27

## 2025-03-17 DIAGNOSIS — G89.29 OTHER CHRONIC PAIN: ICD-10-CM

## 2025-03-20 RX ORDER — TRAMADOL HYDROCHLORIDE 50 MG/1
50 TABLET ORAL EVERY 12 HOURS PRN
Qty: 60 TABLET | Refills: 0 | Status: SHIPPED | OUTPATIENT
Start: 2025-03-20

## 2025-03-20 RX ORDER — DICLOFENAC SODIUM 75 MG/1
75 TABLET, DELAYED RELEASE ORAL 2 TIMES DAILY
Qty: 60 TABLET | Refills: 0 | Status: SHIPPED | OUTPATIENT
Start: 2025-03-20

## 2025-03-23 DIAGNOSIS — F51.01 PRIMARY INSOMNIA: ICD-10-CM

## 2025-03-25 RX ORDER — TEMAZEPAM 15 MG/1
15 CAPSULE ORAL NIGHTLY PRN
Qty: 30 CAPSULE | Refills: 0 | Status: SHIPPED | OUTPATIENT
Start: 2025-03-25

## 2025-04-18 DIAGNOSIS — G89.29 OTHER CHRONIC PAIN: ICD-10-CM

## 2025-04-21 RX ORDER — DICLOFENAC SODIUM 75 MG/1
75 TABLET, DELAYED RELEASE ORAL 2 TIMES DAILY
Qty: 60 TABLET | Refills: 0 | Status: SHIPPED | OUTPATIENT
Start: 2025-04-21

## 2025-04-25 DIAGNOSIS — G89.29 OTHER CHRONIC PAIN: ICD-10-CM

## 2025-04-25 RX ORDER — TRAMADOL HYDROCHLORIDE 50 MG/1
50 TABLET ORAL EVERY 12 HOURS PRN
Qty: 60 TABLET | Refills: 0 | Status: SHIPPED | OUTPATIENT
Start: 2025-04-25

## 2025-05-02 DIAGNOSIS — F51.01 PRIMARY INSOMNIA: ICD-10-CM

## 2025-05-02 RX ORDER — TEMAZEPAM 15 MG/1
15 CAPSULE ORAL NIGHTLY PRN
Qty: 30 CAPSULE | Refills: 3 | Status: SHIPPED | OUTPATIENT
Start: 2025-05-02

## 2025-05-20 DIAGNOSIS — G89.29 OTHER CHRONIC PAIN: ICD-10-CM

## 2025-05-21 RX ORDER — DICLOFENAC SODIUM 75 MG/1
75 TABLET, DELAYED RELEASE ORAL 2 TIMES DAILY
Qty: 60 TABLET | Refills: 0 | Status: SHIPPED | OUTPATIENT
Start: 2025-05-21

## 2025-05-23 DIAGNOSIS — G89.29 OTHER CHRONIC PAIN: ICD-10-CM

## 2025-05-23 RX ORDER — TRAMADOL HYDROCHLORIDE 50 MG/1
50 TABLET ORAL EVERY 12 HOURS PRN
Qty: 60 TABLET | Refills: 0 | Status: SHIPPED | OUTPATIENT
Start: 2025-05-23

## 2025-06-23 DIAGNOSIS — G89.29 OTHER CHRONIC PAIN: ICD-10-CM

## 2025-06-23 RX ORDER — DICLOFENAC SODIUM 75 MG/1
75 TABLET, DELAYED RELEASE ORAL 2 TIMES DAILY
Qty: 60 TABLET | Refills: 0 | Status: SHIPPED | OUTPATIENT
Start: 2025-06-23

## 2025-06-28 DIAGNOSIS — G89.29 OTHER CHRONIC PAIN: ICD-10-CM

## 2025-06-30 RX ORDER — TRAMADOL HYDROCHLORIDE 50 MG/1
50 TABLET ORAL EVERY 12 HOURS PRN
Qty: 60 TABLET | Refills: 0 | Status: SHIPPED | OUTPATIENT
Start: 2025-06-30

## 2025-07-08 ENCOUNTER — READMISSION MANAGEMENT (OUTPATIENT)
Dept: CALL CENTER | Facility: HOSPITAL | Age: 75
End: 2025-07-08
Payer: MEDICARE

## 2025-07-08 NOTE — OUTREACH NOTE
Prep Survey      Flowsheet Row Responses   [facility] facility patient discharged from? Non-[facility]   Is LACE score < 7 ? Non- Discharge   Eligibility [facility]   Date of Admission 07/06/25   Date of Discharge 07/08/25   Discharge diagnosis Small bowel obstructio   Does the patient have one of the following disease processes/diagnoses(primary or secondary)? Other   Prep survey completed? Yes            RAMONE HARRISON - Registered Nurse

## 2025-07-09 ENCOUNTER — TRANSITIONAL CARE MANAGEMENT TELEPHONE ENCOUNTER (OUTPATIENT)
Dept: CALL CENTER | Facility: HOSPITAL | Age: 75
End: 2025-07-09
Payer: MEDICARE

## 2025-07-09 NOTE — OUTREACH NOTE
Call Center TCM Note      Flowsheet Row Responses   [facility] patient discharged from? Non-BH  [[facility]]   Does the patient have one of the following disease processes/diagnoses(primary or secondary)? Other   TCM attempt successful? No   Unsuccessful attempts Attempt 2  [attempted pt and wife]            JAVIER BREWER - Registered Nurse    7/9/2025, 16:18 EDT

## 2025-07-09 NOTE — OUTREACH NOTE
Call Center TCM Note      Flowsheet Row Responses   [facility] facility patient discharged from? Non-BH  [[facility]]   Does the patient have one of the following disease processes/diagnoses(primary or secondary)? Other   TCM attempt successful? No  [vr for Rylee, wife]   Unsuccessful attempts Attempt 1  [attempted pt and wife]            JAVIER Weinstein Registered Nurse    7/9/2025, 14:29 EDT

## 2025-07-10 ENCOUNTER — TRANSITIONAL CARE MANAGEMENT TELEPHONE ENCOUNTER (OUTPATIENT)
Dept: CALL CENTER | Facility: HOSPITAL | Age: 75
End: 2025-07-10
Payer: MEDICARE

## 2025-07-10 NOTE — OUTREACH NOTE
Call Center TCM Note      Flowsheet Row Responses   [facility] patient discharged from? Non-BH   Does the patient have one of the following disease processes/diagnoses(primary or secondary)? Other   TCM attempt successful? No   Unsuccessful attempts Attempt 3   Call Status Left message            Jossie Weinstein Registered Nurse    7/10/2025, 15:08 EDT

## 2025-07-21 ENCOUNTER — OFFICE VISIT (OUTPATIENT)
Dept: FAMILY MEDICINE CLINIC | Facility: CLINIC | Age: 75
End: 2025-07-21
Payer: MEDICARE

## 2025-07-21 VITALS
HEART RATE: 79 BPM | DIASTOLIC BLOOD PRESSURE: 58 MMHG | BODY MASS INDEX: 20.94 KG/M2 | RESPIRATION RATE: 18 BRPM | OXYGEN SATURATION: 98 % | WEIGHT: 154.6 LBS | SYSTOLIC BLOOD PRESSURE: 118 MMHG | HEIGHT: 72 IN

## 2025-07-21 DIAGNOSIS — G89.29 OTHER CHRONIC PAIN: ICD-10-CM

## 2025-07-21 DIAGNOSIS — Z00.00 MEDICARE ANNUAL WELLNESS VISIT, SUBSEQUENT: Primary | ICD-10-CM

## 2025-07-21 DIAGNOSIS — K50.919 CROHN'S DISEASE WITH COMPLICATION, UNSPECIFIED GASTROINTESTINAL TRACT LOCATION: ICD-10-CM

## 2025-07-21 DIAGNOSIS — F51.01 PRIMARY INSOMNIA: ICD-10-CM

## 2025-07-21 PROCEDURE — G0439 PPPS, SUBSEQ VISIT: HCPCS | Performed by: STUDENT IN AN ORGANIZED HEALTH CARE EDUCATION/TRAINING PROGRAM

## 2025-07-21 PROCEDURE — 3074F SYST BP LT 130 MM HG: CPT | Performed by: STUDENT IN AN ORGANIZED HEALTH CARE EDUCATION/TRAINING PROGRAM

## 2025-07-21 PROCEDURE — 96160 PT-FOCUSED HLTH RISK ASSMT: CPT | Performed by: STUDENT IN AN ORGANIZED HEALTH CARE EDUCATION/TRAINING PROGRAM

## 2025-07-21 PROCEDURE — 1125F AMNT PAIN NOTED PAIN PRSNT: CPT | Performed by: STUDENT IN AN ORGANIZED HEALTH CARE EDUCATION/TRAINING PROGRAM

## 2025-07-21 PROCEDURE — 3078F DIAST BP <80 MM HG: CPT | Performed by: STUDENT IN AN ORGANIZED HEALTH CARE EDUCATION/TRAINING PROGRAM

## 2025-07-21 NOTE — PROGRESS NOTES
Subjective   The ABCs of the Annual Wellness Visit  Medicare Wellness Visit      Pramod Trent is a 75 y.o. patient who presents for a Medicare Wellness Visit.    The following portions of the patient's history were reviewed and   updated as appropriate: allergies, current medications, past family history, past medical history, past social history, past surgical history, and problem list.    Compared to one year ago, the patient's physical   health is better.  Compared to one year ago, the patient's mental   health is the same.    Recent Hospitalizations:  He was admitted within the past 365 days at [facility].     Current Medical Providers:  Patient Care Team:  Geovanny Wang DO as PCP - General (Family Medicine)    Outpatient Medications Prior to Visit   Medication Sig Dispense Refill    diclofenac (VOLTAREN) 75 MG EC tablet Take 1 tablet by mouth twice daily 60 tablet 0    finasteride (PROSCAR) 5 MG tablet Take 1 tablet by mouth once daily 90 tablet 0    finasteride (PROSCAR) 5 MG tablet Take 1 tablet by mouth Daily. 90 tablet 3    Remicade 100 MG injection Infuse  into a venous catheter Every 2 (Two) Months.      tamsulosin (FLOMAX) 0.4 MG capsule 24 hr capsule Take 1 capsule by mouth once daily 90 capsule 3    temazepam (RESTORIL) 15 MG capsule TAKE 1 CAPSULE BY MOUTH AT NIGHT AS NEEDED FOR SLEEP 30 capsule 3    traMADol (ULTRAM) 50 MG tablet TAKE 1 TABLET BY MOUTH EVERY 12 HOURS AS NEEDED FOR MODERATE PAIN 60 tablet 0    sertraline (ZOLOFT) 100 MG tablet Take 1 tablet by mouth Daily. 90 tablet 3     No facility-administered medications prior to visit.     Opioid medication/s are on active medication list.  and I have evaluated his active treatment plan and pain score trends (see table).  Vitals:    07/21/25 0903   PainSc: 4    PainLoc: Arm     I have reviewed the chart for potential of high risk medication and harmful drug interactions in the elderly.        Aspirin is not on active medication  "list.  Aspirin use is not indicated based on review of current medical condition/s. Risk of harm outweighs potential benefits.  .    Patient Active Problem List   Diagnosis    Post-traumatic osteoarthritis of one knee, right    DDD (degenerative disc disease), cervical    Erectile dysfunction due to diseases classified elsewhere    Chronic pain syndrome    Anxiety    Need for immunization against influenza    Preventative health care    Essential hypertension    Alcohol overdose    Sciatic nerve pain, right    Screening for prostate cancer    Sciatic nerve pain, left    Functional diarrhea    Alcoholism    Weight loss    Chronic hypokalemia    Small bowel obstruction    Left foot drop    Acute diarrhea    JOSIE (acute kidney injury)    Enterocolitis    History of ileus    Normocytic anemia    Unintentional weight loss    Colitis    History of alcohol abuse    S/P colon resection    Medicare annual wellness visit, subsequent    Thrush, oral    Abnormal findings on diagnostic imaging of other parts of digestive tract    Back pain    Bladder wall thickening    Colostomy hemorrhage    Diarrhea    Hand abscess     Advance Care Planning Advance Directive is not on file.  ACP discussion was held with the patient during this visit. Patient does not have an advance directive, declines further assistance.            Objective   Vitals:    07/21/25 0903   BP: 118/58   Pulse: 79   Resp: 18   SpO2: 98%   Weight: 70.1 kg (154 lb 9.6 oz)   Height: 182.9 cm (72\")   PainSc: 4    PainLoc: Arm       Estimated body mass index is 20.97 kg/m² as calculated from the following:    Height as of this encounter: 182.9 cm (72\").    Weight as of this encounter: 70.1 kg (154 lb 9.6 oz).    BMI is within normal parameters. No other follow-up for BMI required.           Does the patient have evidence of cognitive impairment? No                                                                                                Health  Risk " Assessment    Smoking Status:  Social History     Tobacco Use   Smoking Status Former    Current packs/day: 0.00    Average packs/day: 1 pack/day for 30.0 years (30.0 ttl pk-yrs)    Types: Cigarettes    Start date: 1972    Quit date: 2002    Years since quittin.5    Passive exposure: Past   Smokeless Tobacco Former    Types: Chew     Alcohol Consumption:  Social History     Substance and Sexual Activity   Alcohol Use Not Currently       Fall Risk Screen  STEADI Fall Risk Assessment was completed, and patient is at LOW risk for falls.Assessment completed on:2025    Depression Screening   Little interest or pleasure in doing things? Not at all   Feeling down, depressed, or hopeless? Not at all   PHQ-2 Total Score 0      Health Habits and Functional and Cognitive Screenin/15/2025     9:38 PM   Functional & Cognitive Status   Do you have difficulty preparing food and eating? No   Do you have difficulty bathing yourself, getting dressed or grooming yourself? No   Do you have difficulty using the toilet? No   Do you have difficulty moving around from place to place? No   Do you have trouble with steps or getting out of a bed or a chair? No   Current Diet Well Balanced Diet   Dental Exam Up to date   Eye Exam Up to date   Exercise (times per week) 5 times per week   Current Exercises Include Walking;Yard Work   Do you need help using the phone?  No   Are you deaf or do you have serious difficulty hearing?  No   Do you need help to go to places out of walking distance? No   Do you need help shopping? No   Do you need help preparing meals?  No   Do you need help with housework?  No   Do you need help with laundry? No   Do you need help taking your medications? No   Do you need help managing money? No   Do you ever drive or ride in a car without wearing a seat belt? No   Have you felt unusual fatigue (could be tiredness), stress, anger or loneliness in the last month? No   Who do you live with?  Spouse   If you need help, do you have trouble finding someone available to you? No   Have you been bothered in the last four weeks by sexual problems? No   Do you have difficulty concentrating, remembering or making decisions? No           Age-appropriate Screening Schedule:  Refer to the list below for future screening recommendations based on patient's age, sex and/or medical conditions. Orders for these recommended tests are listed in the plan section. The patient has been provided with a written plan.    Health Maintenance List  Health Maintenance   Topic Date Due    ZOSTER VACCINE (1 of 2) Never done    HEPATITIS C SCREENING  Never done    COVID-19 Vaccine (3 - 2024-25 season) 09/01/2024    INFLUENZA VACCINE  10/01/2025    ANNUAL WELLNESS VISIT  07/21/2026    TDAP/TD VACCINES (2 - Td or Tdap) 03/18/2031    COLORECTAL CANCER SCREENING  06/07/2033    Pneumococcal Vaccine 50+  Completed    AAA SCREEN ONCE  Completed    LUNG CANCER SCREENING  Discontinued                                                                                                                                                CMS Preventative Services Quick Reference  Risk Factors Identified During Encounter  Immunizations Discussed/Encouraged: Shingrix    The above risks/problems have been discussed with the patient.  Pertinent information has been shared with the patient in the After Visit Summary.  An After Visit Summary and PPPS were made available to the patient.    Follow Up:   Next Medicare Wellness visit to be scheduled in 1 year.         Additional E&M Note during same encounter follows:  Patient has additional, significant, and separately identifiable condition(s)/problem(s) that require work above and beyond the Medicare Wellness Visit     Chief Complaint  Medicare Wellness-subsequent    Subjective   HPI  Pramod is also being seen today for additional medical problem/s.  Overall doing well.    Scheduled for ventral hernia repair and  "colostomy reversal Aug 1st.    He has gained some weight back.  Recently hospitalized for partial SBO.  They gave him some IV iron in prep for surgery which he feels was very helpful.    Not taking his diclofenac, taking tylenol instead.        Objective   Vital Signs:  /58   Pulse 79   Resp 18   Ht 182.9 cm (72\")   Wt 70.1 kg (154 lb 9.6 oz)   SpO2 98%   BMI 20.97 kg/m²     GEN: In no acute distress, non toxic appearing  HEENT: Pupils equal and reactive to light, sclera clear. Mucous membranes moist. Oropharynx without erythema or exudate. No cervical or submandibular lymphadenopathy.  CV: Regular rate and rhythm, no murmurs  RESP: Lungs clear to auscultation anteriorly and posteriorly in all lung fields bilaterally.  NEURO: AAO to person, place, and time. CN 2-12 intact grossly.   PSYCH: Affect normal, insight fair               Assessment and Plan      Medicare annual wellness visit, subsequent  Overall reassuring exam.  BP at goal today.  Defer blood work today d/t upcoming surgery and recent hospitalization.  Follows w/ GI who manages colon ca screening.  Next visit will plan for PSA.  Continue to eat well, exercise as able.  Next wellness in 1 yr, f/u sooner prn.         Primary insomnia  Continue temazepam 15 mg nightly.         Other chronic pain  Continue tramadol 50 mg bid prn.         Crohn's disease with complication, unspecified gastrointestinal tract location  Scheduled for ventral hernia repair and ostomy reversal / repositioning on 8/1.    Continue remicade and f/u with GI.                 Follow Up   Return in about 1 year (around 7/21/2026) for Medicare Wellness.  Patient was given instructions and counseling regarding his condition or for health maintenance advice. Please see specific information pulled into the AVS if appropriate.    "

## 2025-07-21 NOTE — ASSESSMENT & PLAN NOTE
Overall reassuring exam.  BP at goal today.  Defer blood work today d/t upcoming surgery and recent hospitalization.  Follows w/ GI who manages colon ca screening.  Next visit will plan for PSA.  Continue to eat well, exercise as able.  Next wellness in 1 yr, f/u sooner prn.